# Patient Record
Sex: MALE | Race: BLACK OR AFRICAN AMERICAN | Employment: UNEMPLOYED | URBAN - METROPOLITAN AREA
[De-identification: names, ages, dates, MRNs, and addresses within clinical notes are randomized per-mention and may not be internally consistent; named-entity substitution may affect disease eponyms.]

---

## 2022-01-01 ENCOUNTER — APPOINTMENT (OUTPATIENT)
Dept: GENERAL RADIOLOGY | Age: 67
DRG: 283 | End: 2022-01-01
Payer: COMMERCIAL

## 2022-01-01 ENCOUNTER — HOSPITAL ENCOUNTER (INPATIENT)
Age: 67
LOS: 5 days | DRG: 283 | End: 2022-10-25
Attending: EMERGENCY MEDICINE | Admitting: INTERNAL MEDICINE
Payer: COMMERCIAL

## 2022-01-01 ENCOUNTER — APPOINTMENT (OUTPATIENT)
Dept: CT IMAGING | Age: 67
DRG: 283 | End: 2022-01-01
Attending: PHYSICIAN ASSISTANT
Payer: COMMERCIAL

## 2022-01-01 ENCOUNTER — APPOINTMENT (OUTPATIENT)
Dept: NON INVASIVE DIAGNOSTICS | Age: 67
DRG: 283 | End: 2022-01-01
Attending: PHYSICIAN ASSISTANT
Payer: COMMERCIAL

## 2022-01-01 ENCOUNTER — APPOINTMENT (OUTPATIENT)
Dept: GENERAL RADIOLOGY | Age: 67
DRG: 283 | End: 2022-01-01
Attending: PHYSICIAN ASSISTANT
Payer: COMMERCIAL

## 2022-01-01 ENCOUNTER — APPOINTMENT (OUTPATIENT)
Dept: GENERAL RADIOLOGY | Age: 67
DRG: 283 | End: 2022-01-01
Attending: INTERNAL MEDICINE
Payer: COMMERCIAL

## 2022-01-01 ENCOUNTER — APPOINTMENT (OUTPATIENT)
Dept: CT IMAGING | Age: 67
DRG: 283 | End: 2022-01-01
Attending: INTERNAL MEDICINE
Payer: COMMERCIAL

## 2022-01-01 ENCOUNTER — APPOINTMENT (OUTPATIENT)
Dept: GENERAL RADIOLOGY | Age: 67
DRG: 283 | End: 2022-01-01
Attending: STUDENT IN AN ORGANIZED HEALTH CARE EDUCATION/TRAINING PROGRAM
Payer: COMMERCIAL

## 2022-01-01 VITALS — BODY MASS INDEX: 28.24 KG/M2 | HEIGHT: 67 IN | TEMPERATURE: 98.1 F | WEIGHT: 179.9 LBS | HEART RATE: 71 BPM

## 2022-01-01 DIAGNOSIS — R57.0 CARDIOGENIC SHOCK (HCC): ICD-10-CM

## 2022-01-01 DIAGNOSIS — J96.01 ACUTE RESPIRATORY FAILURE WITH HYPOXIA (HCC): Primary | ICD-10-CM

## 2022-01-01 DIAGNOSIS — Z71.89 GOALS OF CARE, COUNSELING/DISCUSSION: ICD-10-CM

## 2022-01-01 DIAGNOSIS — I21.4 NSTEMI (NON-ST ELEVATED MYOCARDIAL INFARCTION) (HCC): ICD-10-CM

## 2022-01-01 DIAGNOSIS — G93.40 ACUTE ENCEPHALOPATHY: ICD-10-CM

## 2022-01-01 DIAGNOSIS — I50.9 ACUTE ON CHRONIC CONGESTIVE HEART FAILURE, UNSPECIFIED HEART FAILURE TYPE (HCC): ICD-10-CM

## 2022-01-01 DIAGNOSIS — J15.5 PNEUMONIA DUE TO ESCHERICHIA COLI, UNSPECIFIED LATERALITY, UNSPECIFIED PART OF LUNG (HCC): ICD-10-CM

## 2022-01-01 DIAGNOSIS — Z51.5 ENCOUNTER FOR PALLIATIVE CARE: ICD-10-CM

## 2022-01-01 DIAGNOSIS — A41.51 SEPSIS DUE TO ESCHERICHIA COLI, UNSPECIFIED WHETHER ACUTE ORGAN DYSFUNCTION PRESENT (HCC): ICD-10-CM

## 2022-01-01 LAB
ACC. NO. FROM MICRO ORDER, ACCP: ABNORMAL
ACINETOBACTER CALCOACETICUS-BAUMANII COMPLEX, ACBCX: NOT DETECTED
ALBUMIN SERPL-MCNC: 2.6 G/DL (ref 3.4–5)
ALBUMIN SERPL-MCNC: 2.8 G/DL (ref 3.4–5)
ALBUMIN SERPL-MCNC: 2.9 G/DL (ref 3.4–5)
ALBUMIN SERPL-MCNC: 3 G/DL (ref 3.4–5)
ALBUMIN SERPL-MCNC: 3.1 G/DL (ref 3.4–5)
ALBUMIN SERPL-MCNC: 3.2 G/DL (ref 3.4–5)
ALBUMIN SERPL-MCNC: 3.2 G/DL (ref 3.4–5)
ALBUMIN SERPL-MCNC: 3.3 G/DL (ref 3.4–5)
ALBUMIN/GLOB SERPL: 0.8 {RATIO} (ref 0.8–1.7)
ALP SERPL-CCNC: 63 U/L (ref 45–117)
ALT SERPL-CCNC: 16 U/L (ref 16–61)
AMPHET UR QL SCN: NEGATIVE
ANION GAP SERPL CALC-SCNC: 10 MMOL/L (ref 3–18)
ANION GAP SERPL CALC-SCNC: 10 MMOL/L (ref 3–18)
ANION GAP SERPL CALC-SCNC: 6 MMOL/L (ref 3–18)
ANION GAP SERPL CALC-SCNC: 7 MMOL/L (ref 3–18)
ANION GAP SERPL CALC-SCNC: 8 MMOL/L (ref 3–18)
APPEARANCE UR: CLEAR
APTT PPP: 31.2 SEC (ref 23–36.4)
APTT PPP: 37.1 SEC (ref 23–36.4)
APTT PPP: 48.2 SEC (ref 23–36.4)
APTT PPP: 49.6 SEC (ref 23–36.4)
APTT PPP: 49.6 SEC (ref 23–36.4)
APTT PPP: 49.8 SEC (ref 23–36.4)
APTT PPP: 58.6 SEC (ref 23–36.4)
APTT PPP: 60.6 SEC (ref 23–36.4)
APTT PPP: 64 SEC (ref 23–36.4)
APTT PPP: 65.7 SEC (ref 23–36.4)
APTT PPP: 68 SEC (ref 23–36.4)
APTT PPP: 74.3 SEC (ref 23–36.4)
APTT PPP: 79.5 SEC (ref 23–36.4)
ARTERIAL PATENCY WRIST A: ABNORMAL
ARTERIAL PATENCY WRIST A: POSITIVE
AST SERPL-CCNC: 63 U/L (ref 10–38)
ATRIAL RATE: 101 BPM
ATRIAL RATE: 115 BPM
ATRIAL RATE: 72 BPM
ATRIAL RATE: 99 BPM
B PERT DNA SPEC QL NAA+PROBE: NOT DETECTED
BACTERIA SPEC CULT: ABNORMAL
BACTERIA SPEC CULT: NORMAL
BACTERIA SPEC CULT: NORMAL
BACTERIA URNS QL MICRO: NEGATIVE /HPF
BACTEROIDES FRAGILIS, BFRA: NOT DETECTED
BARBITURATES UR QL SCN: NEGATIVE
BASE DEFICIT BLD-SCNC: 0.6 MMOL/L
BASE DEFICIT BLD-SCNC: 0.9 MMOL/L
BASE DEFICIT BLD-SCNC: 1.7 MMOL/L
BASE DEFICIT BLD-SCNC: 2.5 MMOL/L
BASE DEFICIT BLD-SCNC: 3.1 MMOL/L
BASE EXCESS BLD CALC-SCNC: 0.3 MMOL/L
BASE EXCESS BLD CALC-SCNC: 1.8 MMOL/L
BASE EXCESS BLD CALC-SCNC: 2.8 MMOL/L
BASE EXCESS BLD CALC-SCNC: 3.2 MMOL/L
BASE EXCESS BLD CALC-SCNC: 3.5 MMOL/L
BASOPHILS # BLD: 0 K/UL (ref 0–0.1)
BASOPHILS # BLD: 0.1 K/UL (ref 0–0.1)
BASOPHILS # BLD: 0.1 K/UL (ref 0–0.1)
BASOPHILS NFR BLD: 0 % (ref 0–2)
BASOPHILS NFR BLD: 1 % (ref 0–2)
BDY SITE: ABNORMAL
BENZODIAZ UR QL: POSITIVE
BILIRUB SERPL-MCNC: 0.7 MG/DL (ref 0.2–1)
BILIRUB UR QL: NEGATIVE
BIOFIRE COMMENT, BCIDPF: ABNORMAL
BNP SERPL-MCNC: 6190 PG/ML (ref 0–900)
BNP SERPL-MCNC: 7481 PG/ML (ref 0–900)
BODY TEMPERATURE: 99
BODY TEMPERATURE: 99
BORDETELLA PARAPERTUSSIS PCR, BORPAR: NOT DETECTED
BUN SERPL-MCNC: 19 MG/DL (ref 7–18)
BUN SERPL-MCNC: 19 MG/DL (ref 7–18)
BUN SERPL-MCNC: 20 MG/DL (ref 7–18)
BUN SERPL-MCNC: 21 MG/DL (ref 7–18)
BUN SERPL-MCNC: 21 MG/DL (ref 7–18)
BUN SERPL-MCNC: 22 MG/DL (ref 7–18)
BUN SERPL-MCNC: 24 MG/DL (ref 7–18)
BUN SERPL-MCNC: 24 MG/DL (ref 7–18)
BUN/CREAT SERPL: 15 (ref 12–20)
BUN/CREAT SERPL: 16 (ref 12–20)
BUN/CREAT SERPL: 16 (ref 12–20)
BUN/CREAT SERPL: 17 (ref 12–20)
BUN/CREAT SERPL: 20 (ref 12–20)
BUN/CREAT SERPL: 22 (ref 12–20)
BUN/CREAT SERPL: 25 (ref 12–20)
BUN/CREAT SERPL: 25 (ref 12–20)
BUN/CREAT SERPL: 26 (ref 12–20)
BUN/CREAT SERPL: 26 (ref 12–20)
BUN/CREAT SERPL: 29 (ref 12–20)
BUN/CREAT SERPL: 31 (ref 12–20)
BUN/CREAT SERPL: 31 (ref 12–20)
BUN/CREAT SERPL: 35 (ref 12–20)
C GLABRATA DNA VAG QL NAA+PROBE: NOT DETECTED
C PNEUM DNA SPEC QL NAA+PROBE: NOT DETECTED
CA-I SERPL-SCNC: 1.18 MMOL/L (ref 1.15–1.33)
CALCIUM SERPL-MCNC: 7.8 MG/DL (ref 8.5–10.1)
CALCIUM SERPL-MCNC: 7.9 MG/DL (ref 8.5–10.1)
CALCIUM SERPL-MCNC: 8 MG/DL (ref 8.5–10.1)
CALCIUM SERPL-MCNC: 8 MG/DL (ref 8.5–10.1)
CALCIUM SERPL-MCNC: 8.3 MG/DL (ref 8.5–10.1)
CALCIUM SERPL-MCNC: 8.6 MG/DL (ref 8.5–10.1)
CALCIUM SERPL-MCNC: 8.6 MG/DL (ref 8.5–10.1)
CALCIUM SERPL-MCNC: 8.7 MG/DL (ref 8.5–10.1)
CALCIUM SERPL-MCNC: 8.8 MG/DL (ref 8.5–10.1)
CALCIUM SERPL-MCNC: 9 MG/DL (ref 8.5–10.1)
CALCIUM SERPL-MCNC: 9.1 MG/DL (ref 8.5–10.1)
CALCIUM SERPL-MCNC: 9.2 MG/DL (ref 8.5–10.1)
CALCIUM SERPL-MCNC: 9.3 MG/DL (ref 8.5–10.1)
CALCULATED P AXIS, ECG09: 18 DEGREES
CALCULATED P AXIS, ECG09: 37 DEGREES
CALCULATED P AXIS, ECG09: 54 DEGREES
CALCULATED P AXIS, ECG09: 74 DEGREES
CALCULATED R AXIS, ECG10: -56 DEGREES
CALCULATED R AXIS, ECG10: -56 DEGREES
CALCULATED R AXIS, ECG10: -59 DEGREES
CALCULATED R AXIS, ECG10: -62 DEGREES
CALCULATED T AXIS, ECG11: -88 DEGREES
CALCULATED T AXIS, ECG11: 100 DEGREES
CALCULATED T AXIS, ECG11: 115 DEGREES
CALCULATED T AXIS, ECG11: 123 DEGREES
CANDIDA ALBICANS: NOT DETECTED
CANDIDA AURIS, CAAU: NOT DETECTED
CANDIDA KRUSEI, CKRP: NOT DETECTED
CANDIDA PARAPSILOSIS, CPAUP: NOT DETECTED
CANDIDA TROPICALIS, CTROP: NOT DETECTED
CANNABINOIDS UR QL SCN: NEGATIVE
CHLORIDE SERPL-SCNC: 103 MMOL/L (ref 100–111)
CHLORIDE SERPL-SCNC: 103 MMOL/L (ref 100–111)
CHLORIDE SERPL-SCNC: 104 MMOL/L (ref 100–111)
CHLORIDE SERPL-SCNC: 104 MMOL/L (ref 100–111)
CHLORIDE SERPL-SCNC: 105 MMOL/L (ref 100–111)
CHLORIDE SERPL-SCNC: 106 MMOL/L (ref 100–111)
CHLORIDE SERPL-SCNC: 107 MMOL/L (ref 100–111)
CHLORIDE SERPL-SCNC: 108 MMOL/L (ref 100–111)
CHLORIDE SERPL-SCNC: 109 MMOL/L (ref 100–111)
CHLORIDE SERPL-SCNC: 110 MMOL/L (ref 100–111)
CO2 SERPL-SCNC: 24 MMOL/L (ref 21–32)
CO2 SERPL-SCNC: 25 MMOL/L (ref 21–32)
CO2 SERPL-SCNC: 26 MMOL/L (ref 21–32)
CO2 SERPL-SCNC: 26 MMOL/L (ref 21–32)
CO2 SERPL-SCNC: 27 MMOL/L (ref 21–32)
CO2 SERPL-SCNC: 28 MMOL/L (ref 21–32)
CO2 SERPL-SCNC: 29 MMOL/L (ref 21–32)
CO2 SERPL-SCNC: 30 MMOL/L (ref 21–32)
CO2 SERPL-SCNC: 31 MMOL/L (ref 21–32)
COCAINE UR QL SCN: NEGATIVE
COLOR UR: YELLOW
CREAT SERPL-MCNC: 0.63 MG/DL (ref 0.6–1.3)
CREAT SERPL-MCNC: 0.7 MG/DL (ref 0.6–1.3)
CREAT SERPL-MCNC: 0.71 MG/DL (ref 0.6–1.3)
CREAT SERPL-MCNC: 0.72 MG/DL (ref 0.6–1.3)
CREAT SERPL-MCNC: 0.77 MG/DL (ref 0.6–1.3)
CREAT SERPL-MCNC: 0.84 MG/DL (ref 0.6–1.3)
CREAT SERPL-MCNC: 0.85 MG/DL (ref 0.6–1.3)
CREAT SERPL-MCNC: 0.92 MG/DL (ref 0.6–1.3)
CREAT SERPL-MCNC: 0.93 MG/DL (ref 0.6–1.3)
CREAT SERPL-MCNC: 0.94 MG/DL (ref 0.6–1.3)
CREAT SERPL-MCNC: 1.02 MG/DL (ref 0.6–1.3)
CREAT SERPL-MCNC: 1.18 MG/DL (ref 0.6–1.3)
CREAT SERPL-MCNC: 1.19 MG/DL (ref 0.6–1.3)
CREAT SERPL-MCNC: 1.19 MG/DL (ref 0.6–1.3)
CREAT SERPL-MCNC: 1.28 MG/DL (ref 0.6–1.3)
CREAT SERPL-MCNC: 1.45 MG/DL (ref 0.6–1.3)
CRYPTO NEOFORMANS/GATTII, CRYNEG: NOT DETECTED
DIAGNOSIS, 93000: NORMAL
DIFFERENTIAL METHOD BLD: ABNORMAL
ECHO AO ROOT DIAM: 3.5 CM
ECHO AO ROOT INDEX: 1.81 CM/M2
ECHO LA VOL 2C: 67 ML (ref 18–58)
ECHO LA VOL 4C: 73 ML (ref 18–58)
ECHO LA VOLUME AREA LENGTH: 81 ML
ECHO LA VOLUME INDEX A2C: 35 ML/M2 (ref 16–34)
ECHO LA VOLUME INDEX A4C: 38 ML/M2 (ref 16–34)
ECHO LA VOLUME INDEX AREA LENGTH: 42 ML/M2 (ref 16–34)
ECHO LV FRACTIONAL SHORTENING: 8 % (ref 28–44)
ECHO LV INTERNAL DIMENSION DIASTOLE INDEX: 3.26 CM/M2
ECHO LV INTERNAL DIMENSION DIASTOLIC: 6.3 CM (ref 4.2–5.9)
ECHO LV INTERNAL DIMENSION SYSTOLIC INDEX: 3.01 CM/M2
ECHO LV INTERNAL DIMENSION SYSTOLIC: 5.8 CM
ECHO LV IVSD: 1.2 CM (ref 0.6–1)
ECHO LV MASS 2D: 303.5 G (ref 88–224)
ECHO LV MASS INDEX 2D: 157.3 G/M2 (ref 49–115)
ECHO LV POSTERIOR WALL DIASTOLIC: 1 CM (ref 0.6–1)
ECHO LV RELATIVE WALL THICKNESS RATIO: 0.32
ECHO LVOT AREA: 3.1 CM2
ECHO LVOT DIAM: 2 CM
ECHO LVOT MEAN GRADIENT: 3 MMHG
ECHO LVOT PEAK GRADIENT: 0 MILLIMETER OF MERCURY COLUMN
ECHO LVOT PEAK GRADIENT: 6 MMHG
ECHO LVOT PEAK VELOCITY: 0 M/S
ECHO LVOT PEAK VELOCITY: 1.2 M/S
ECHO LVOT STROKE VOLUME INDEX: 34.2 ML/M2
ECHO LVOT SV: 65.9 ML
ECHO LVOT VTI: 21 CM
ECHO RV FREE WALL PEAK S': 12 CM/S
ECHO RV TAPSE: 1.3 CM (ref 1.7–?)
ENTEROBACTER CLOACAE COMPLEX, ECCP: NOT DETECTED
ENTEROBACTERALES SP. , ENBLS: NOT DETECTED
ENTEROCOCCUS FAECALIS, ENFA: NOT DETECTED
ENTEROCOCCUS FAECIUM, ENFAM: NOT DETECTED
EOSINOPHIL # BLD: 0 K/UL (ref 0–0.4)
EOSINOPHIL # BLD: 0.1 K/UL (ref 0–0.4)
EOSINOPHIL # BLD: 0.1 K/UL (ref 0–0.4)
EOSINOPHIL # BLD: 0.2 K/UL (ref 0–0.4)
EOSINOPHIL NFR BLD: 0 % (ref 0–5)
EOSINOPHIL NFR BLD: 1 % (ref 0–5)
EOSINOPHIL NFR BLD: 2 % (ref 0–5)
EOSINOPHIL NFR BLD: 3 % (ref 0–5)
EPITH CASTS URNS QL MICRO: ABNORMAL /LPF (ref 0–5)
ERYTHROCYTE [DISTWIDTH] IN BLOOD BY AUTOMATED COUNT: 14.2 % (ref 11.6–14.5)
ERYTHROCYTE [DISTWIDTH] IN BLOOD BY AUTOMATED COUNT: 14.3 % (ref 11.6–14.5)
ERYTHROCYTE [DISTWIDTH] IN BLOOD BY AUTOMATED COUNT: 14.4 % (ref 11.6–14.5)
ERYTHROCYTE [DISTWIDTH] IN BLOOD BY AUTOMATED COUNT: 14.4 % (ref 11.6–14.5)
ERYTHROCYTE [DISTWIDTH] IN BLOOD BY AUTOMATED COUNT: 14.5 % (ref 11.6–14.5)
ESCHERICHIA COLI: NOT DETECTED
EST. AVERAGE GLUCOSE BLD GHB EST-MCNC: 114 MG/DL
ETHANOL SERPL-MCNC: <3 MG/DL (ref 0–3)
FLUAV RNA SPEC QL NAA+PROBE: NOT DETECTED
FLUAV SUBTYP SPEC NAA+PROBE: NOT DETECTED
FLUBV RNA SPEC QL NAA+PROBE: NOT DETECTED
FLUBV RNA SPEC QL NAA+PROBE: NOT DETECTED
GAS FLOW.O2 O2 DELIVERY SYS: ABNORMAL L/MIN
GAS FLOW.O2 SETTING OXYMISER: 12 BPM
GAS FLOW.O2 SETTING OXYMISER: 14 BPM
GAS FLOW.O2 SETTING OXYMISER: 17 BPM
GAS FLOW.O2 SETTING OXYMISER: 20 BPM
GAS FLOW.O2 SETTING OXYMISER: 20 BPM
GLOBULIN SER CALC-MCNC: 3.9 G/DL (ref 2–4)
GLUCOSE BLD STRIP.AUTO-MCNC: 112 MG/DL (ref 70–110)
GLUCOSE BLD STRIP.AUTO-MCNC: 112 MG/DL (ref 70–110)
GLUCOSE BLD STRIP.AUTO-MCNC: 117 MG/DL (ref 70–110)
GLUCOSE BLD STRIP.AUTO-MCNC: 122 MG/DL (ref 70–110)
GLUCOSE BLD STRIP.AUTO-MCNC: 126 MG/DL (ref 70–110)
GLUCOSE BLD STRIP.AUTO-MCNC: 129 MG/DL (ref 70–110)
GLUCOSE BLD STRIP.AUTO-MCNC: 129 MG/DL (ref 70–110)
GLUCOSE BLD STRIP.AUTO-MCNC: 135 MG/DL (ref 70–110)
GLUCOSE BLD STRIP.AUTO-MCNC: 137 MG/DL (ref 70–110)
GLUCOSE BLD STRIP.AUTO-MCNC: 138 MG/DL (ref 70–110)
GLUCOSE BLD STRIP.AUTO-MCNC: 141 MG/DL (ref 70–110)
GLUCOSE BLD STRIP.AUTO-MCNC: 144 MG/DL (ref 70–110)
GLUCOSE BLD STRIP.AUTO-MCNC: 144 MG/DL (ref 70–110)
GLUCOSE BLD STRIP.AUTO-MCNC: 149 MG/DL (ref 70–110)
GLUCOSE BLD STRIP.AUTO-MCNC: 160 MG/DL (ref 70–110)
GLUCOSE BLD STRIP.AUTO-MCNC: 169 MG/DL (ref 70–110)
GLUCOSE BLD STRIP.AUTO-MCNC: 170 MG/DL (ref 70–110)
GLUCOSE BLD STRIP.AUTO-MCNC: 172 MG/DL (ref 70–110)
GLUCOSE BLD STRIP.AUTO-MCNC: 186 MG/DL (ref 70–110)
GLUCOSE SERPL-MCNC: 121 MG/DL (ref 74–99)
GLUCOSE SERPL-MCNC: 133 MG/DL (ref 74–99)
GLUCOSE SERPL-MCNC: 140 MG/DL (ref 74–99)
GLUCOSE SERPL-MCNC: 140 MG/DL (ref 74–99)
GLUCOSE SERPL-MCNC: 143 MG/DL (ref 74–99)
GLUCOSE SERPL-MCNC: 143 MG/DL (ref 74–99)
GLUCOSE SERPL-MCNC: 150 MG/DL (ref 74–99)
GLUCOSE SERPL-MCNC: 155 MG/DL (ref 74–99)
GLUCOSE SERPL-MCNC: 158 MG/DL (ref 74–99)
GLUCOSE SERPL-MCNC: 163 MG/DL (ref 74–99)
GLUCOSE SERPL-MCNC: 166 MG/DL (ref 74–99)
GLUCOSE SERPL-MCNC: 167 MG/DL (ref 74–99)
GLUCOSE SERPL-MCNC: 168 MG/DL (ref 74–99)
GLUCOSE SERPL-MCNC: 192 MG/DL (ref 74–99)
GLUCOSE UR STRIP.AUTO-MCNC: NEGATIVE MG/DL
GRAM STN SPEC: ABNORMAL
HADV DNA SPEC QL NAA+PROBE: NOT DETECTED
HAEMOPHILUS INFLUENZAE, HMI: NOT DETECTED
HBA1C MFR BLD: 5.6 % (ref 4.2–5.6)
HCO3 BLD-SCNC: 20.3 MMOL/L (ref 22–26)
HCO3 BLD-SCNC: 21.7 MMOL/L (ref 22–26)
HCO3 BLD-SCNC: 22.2 MMOL/L (ref 22–26)
HCO3 BLD-SCNC: 22.3 MMOL/L (ref 22–26)
HCO3 BLD-SCNC: 24.1 MMOL/L (ref 22–26)
HCO3 BLD-SCNC: 26.3 MMOL/L (ref 22–26)
HCO3 BLD-SCNC: 26.6 MMOL/L (ref 22–26)
HCO3 BLD-SCNC: 28.7 MMOL/L (ref 22–26)
HCO3 BLD-SCNC: 28.7 MMOL/L (ref 22–26)
HCO3 BLD-SCNC: 30.4 MMOL/L (ref 22–26)
HCOV 229E RNA SPEC QL NAA+PROBE: NOT DETECTED
HCOV HKU1 RNA SPEC QL NAA+PROBE: NOT DETECTED
HCOV NL63 RNA SPEC QL NAA+PROBE: NOT DETECTED
HCOV OC43 RNA SPEC QL NAA+PROBE: NOT DETECTED
HCT VFR BLD AUTO: 27.8 % (ref 36–48)
HCT VFR BLD AUTO: 28.7 % (ref 36–48)
HCT VFR BLD AUTO: 28.8 % (ref 36–48)
HCT VFR BLD AUTO: 30.6 % (ref 36–48)
HCT VFR BLD AUTO: 31.5 % (ref 36–48)
HCT VFR BLD AUTO: 37.5 % (ref 36–48)
HCT VFR BLD AUTO: 37.9 % (ref 36–48)
HDSCOM,HDSCOM: ABNORMAL
HGB BLD-MCNC: 11.8 G/DL (ref 13–16)
HGB BLD-MCNC: 11.9 G/DL (ref 13–16)
HGB BLD-MCNC: 8.3 G/DL (ref 13–16)
HGB BLD-MCNC: 8.7 G/DL (ref 13–16)
HGB BLD-MCNC: 8.8 G/DL (ref 13–16)
HGB BLD-MCNC: 9.2 G/DL (ref 13–16)
HGB BLD-MCNC: 9.7 G/DL (ref 13–16)
HGB UR QL STRIP: ABNORMAL
HMPV RNA SPEC QL NAA+PROBE: NOT DETECTED
HPIV1 RNA SPEC QL NAA+PROBE: NOT DETECTED
HPIV2 RNA SPEC QL NAA+PROBE: NOT DETECTED
HPIV3 RNA SPEC QL NAA+PROBE: NOT DETECTED
HPIV4 RNA SPEC QL NAA+PROBE: NOT DETECTED
HYALINE CASTS URNS QL MICRO: ABNORMAL /LPF (ref 0–2)
IMM GRANULOCYTES # BLD AUTO: 0 K/UL (ref 0–0.04)
IMM GRANULOCYTES # BLD AUTO: 0.1 K/UL (ref 0–0.04)
IMM GRANULOCYTES # BLD AUTO: 0.1 K/UL (ref 0–0.04)
IMM GRANULOCYTES NFR BLD AUTO: 0 % (ref 0–0.5)
IMM GRANULOCYTES NFR BLD AUTO: 1 % (ref 0–0.5)
INR PPP: 1.1 (ref 0.8–1.2)
KETONES UR QL STRIP.AUTO: NEGATIVE MG/DL
KLEBSIELLA AEROGENES, KLAE: NOT DETECTED
KLEBSIELLA OXYTOCA: NOT DETECTED
KLEBSIELLA PNEUMONIAE GROUP, KPPG: NOT DETECTED
LACTATE BLD-SCNC: 1.83 MMOL/L (ref 0.4–2)
LACTATE BLD-SCNC: 2.03 MMOL/L (ref 0.4–2)
LACTATE BLD-SCNC: 2.32 MMOL/L (ref 0.4–2)
LACTATE SERPL-SCNC: 1.5 MMOL/L (ref 0.4–2)
LACTATE SERPL-SCNC: 1.7 MMOL/L (ref 0.4–2)
LACTATE SERPL-SCNC: 2 MMOL/L (ref 0.4–2)
LEUKOCYTE ESTERASE UR QL STRIP.AUTO: ABNORMAL
LISTERIA MONOCYTOGENES, LMONP: NOT DETECTED
LYMPHOCYTES # BLD: 0.8 K/UL (ref 0.9–3.6)
LYMPHOCYTES # BLD: 0.8 K/UL (ref 0.9–3.6)
LYMPHOCYTES # BLD: 0.9 K/UL (ref 0.9–3.6)
LYMPHOCYTES # BLD: 1.1 K/UL (ref 0.9–3.6)
LYMPHOCYTES # BLD: 1.6 K/UL (ref 0.9–3.6)
LYMPHOCYTES # BLD: 1.9 K/UL (ref 0.9–3.6)
LYMPHOCYTES # BLD: 3 K/UL (ref 0.9–3.6)
LYMPHOCYTES NFR BLD: 10 % (ref 21–52)
LYMPHOCYTES NFR BLD: 13 % (ref 21–52)
LYMPHOCYTES NFR BLD: 15 % (ref 21–52)
LYMPHOCYTES NFR BLD: 17 % (ref 21–52)
LYMPHOCYTES NFR BLD: 18 % (ref 21–52)
LYMPHOCYTES NFR BLD: 24 % (ref 21–52)
LYMPHOCYTES NFR BLD: 7 % (ref 21–52)
M PNEUMO DNA SPEC QL NAA+PROBE: NOT DETECTED
MAGNESIUM SERPL-MCNC: 1.6 MG/DL (ref 1.6–2.6)
MAGNESIUM SERPL-MCNC: 2.1 MG/DL (ref 1.6–2.6)
MAGNESIUM SERPL-MCNC: 2.2 MG/DL (ref 1.6–2.6)
MAGNESIUM SERPL-MCNC: 2.3 MG/DL (ref 1.6–2.6)
MAGNESIUM SERPL-MCNC: 2.4 MG/DL (ref 1.6–2.6)
MAGNESIUM SERPL-MCNC: 2.6 MG/DL (ref 1.6–2.6)
MCH RBC QN AUTO: 27.1 PG (ref 24–34)
MCH RBC QN AUTO: 27.3 PG (ref 24–34)
MCH RBC QN AUTO: 27.5 PG (ref 24–34)
MCH RBC QN AUTO: 27.7 PG (ref 24–34)
MCH RBC QN AUTO: 27.7 PG (ref 24–34)
MCH RBC QN AUTO: 27.8 PG (ref 24–34)
MCH RBC QN AUTO: 28.3 PG (ref 24–34)
MCHC RBC AUTO-ENTMCNC: 29.9 G/DL (ref 31–37)
MCHC RBC AUTO-ENTMCNC: 30.1 G/DL (ref 31–37)
MCHC RBC AUTO-ENTMCNC: 30.2 G/DL (ref 31–37)
MCHC RBC AUTO-ENTMCNC: 30.7 G/DL (ref 31–37)
MCHC RBC AUTO-ENTMCNC: 30.8 G/DL (ref 31–37)
MCHC RBC AUTO-ENTMCNC: 31.1 G/DL (ref 31–37)
MCHC RBC AUTO-ENTMCNC: 31.7 G/DL (ref 31–37)
MCV RBC AUTO: 86.9 FL (ref 78–100)
MCV RBC AUTO: 87.6 FL (ref 78–100)
MCV RBC AUTO: 90 FL (ref 78–100)
MCV RBC AUTO: 90.3 FL (ref 78–100)
MCV RBC AUTO: 91.6 FL (ref 78–100)
MCV RBC AUTO: 92.3 FL (ref 78–100)
MCV RBC AUTO: 92.7 FL (ref 78–100)
MECA/C (METHICILLIN RESISTANT GENE), MECACP: NOT DETECTED
METHADONE UR QL: NEGATIVE
MONOCYTES # BLD: 0.4 K/UL (ref 0.05–1.2)
MONOCYTES # BLD: 0.5 K/UL (ref 0.05–1.2)
MONOCYTES # BLD: 0.6 K/UL (ref 0.05–1.2)
MONOCYTES # BLD: 0.6 K/UL (ref 0.05–1.2)
MONOCYTES # BLD: 0.7 K/UL (ref 0.05–1.2)
MONOCYTES # BLD: 0.7 K/UL (ref 0.05–1.2)
MONOCYTES # BLD: 1.1 K/UL (ref 0.05–1.2)
MONOCYTES NFR BLD: 6 % (ref 3–10)
MONOCYTES NFR BLD: 7 % (ref 3–10)
MONOCYTES NFR BLD: 8 % (ref 3–10)
MONOCYTES NFR BLD: 9 % (ref 3–10)
NEISSERIA MENINGITIDIS, NMNI: NOT DETECTED
NEUTS SEG # BLD: 4.5 K/UL (ref 1.8–8)
NEUTS SEG # BLD: 4.6 K/UL (ref 1.8–8)
NEUTS SEG # BLD: 7.6 K/UL (ref 1.8–8)
NEUTS SEG # BLD: 7.7 K/UL (ref 1.8–8)
NEUTS SEG # BLD: 8.3 K/UL (ref 1.8–8)
NEUTS SEG # BLD: 8.3 K/UL (ref 1.8–8)
NEUTS SEG # BLD: 8.8 K/UL (ref 1.8–8)
NEUTS SEG NFR BLD: 66 % (ref 40–73)
NEUTS SEG NFR BLD: 72 % (ref 40–73)
NEUTS SEG NFR BLD: 73 % (ref 40–73)
NEUTS SEG NFR BLD: 77 % (ref 40–73)
NEUTS SEG NFR BLD: 78 % (ref 40–73)
NEUTS SEG NFR BLD: 83 % (ref 40–73)
NEUTS SEG NFR BLD: 86 % (ref 40–73)
NITRITE UR QL STRIP.AUTO: NEGATIVE
NRBC # BLD: 0 K/UL (ref 0–0.01)
NRBC BLD-RTO: 0 PER 100 WBC
O2/TOTAL GAS SETTING VFR VENT: 100 %
O2/TOTAL GAS SETTING VFR VENT: 35 %
O2/TOTAL GAS SETTING VFR VENT: 40 %
O2/TOTAL GAS SETTING VFR VENT: 55 %
O2/TOTAL GAS SETTING VFR VENT: 55 %
O2/TOTAL GAS SETTING VFR VENT: 60 %
O2/TOTAL GAS SETTING VFR VENT: 75 %
O2/TOTAL GAS SETTING VFR VENT: 75 %
O2/TOTAL GAS SETTING VFR VENT: 90 %
O2/TOTAL GAS SETTING VFR VENT: 95 %
OPIATES UR QL: NEGATIVE
P-R INTERVAL, ECG05: 162 MS
P-R INTERVAL, ECG05: 166 MS
P-R INTERVAL, ECG05: 168 MS
P-R INTERVAL, ECG05: 180 MS
PAW @ MEAN EXP FLOW ON VENT: 14 CMH2O
PAW @ MEAN EXP FLOW ON VENT: 16 CMH2O
PAW @ MEAN EXP FLOW ON VENT: 23 CMH2O
PCO2 BLD: 25.1 MMHG (ref 35–45)
PCO2 BLD: 28.4 MMHG (ref 35–45)
PCO2 BLD: 30.9 MMHG (ref 35–45)
PCO2 BLD: 40.5 MMHG (ref 35–45)
PCO2 BLD: 41.8 MMHG (ref 35–45)
PCO2 BLD: 45 MMHG (ref 35–45)
PCO2 BLD: 46.8 MMHG (ref 35–45)
PCO2 BLD: 49.4 MMHG (ref 35–45)
PCO2 BLD: 51.6 MMHG (ref 35–45)
PCO2 BLD: 57 MMHG (ref 35–45)
PCP UR QL: NEGATIVE
PEEP RESPIRATORY: 10 CMH2O
PEEP RESPIRATORY: 15 CMH2O
PH BLD: 7.32 [PH] (ref 7.35–7.45)
PH BLD: 7.34 [PH] (ref 7.35–7.45)
PH BLD: 7.34 [PH] (ref 7.35–7.45)
PH BLD: 7.35 [PH] (ref 7.35–7.45)
PH BLD: 7.37 [PH] (ref 7.35–7.45)
PH BLD: 7.4 [PH] (ref 7.35–7.45)
PH BLD: 7.41 [PH] (ref 7.35–7.45)
PH BLD: 7.46 [PH] (ref 7.35–7.45)
PH BLD: 7.46 [PH] (ref 7.35–7.45)
PH BLD: 7.55 [PH] (ref 7.35–7.45)
PH UR STRIP: 5 [PH] (ref 5–8)
PHOSPHATE SERPL-MCNC: 1.9 MG/DL (ref 2.5–4.9)
PHOSPHATE SERPL-MCNC: 2.2 MG/DL (ref 2.5–4.9)
PHOSPHATE SERPL-MCNC: 2.3 MG/DL (ref 2.5–4.9)
PHOSPHATE SERPL-MCNC: 2.3 MG/DL (ref 2.5–4.9)
PHOSPHATE SERPL-MCNC: 2.5 MG/DL (ref 2.5–4.9)
PHOSPHATE SERPL-MCNC: 2.6 MG/DL (ref 2.5–4.9)
PHOSPHATE SERPL-MCNC: 2.6 MG/DL (ref 2.5–4.9)
PHOSPHATE SERPL-MCNC: 2.7 MG/DL (ref 2.5–4.9)
PHOSPHATE SERPL-MCNC: 2.8 MG/DL (ref 2.5–4.9)
PHOSPHATE SERPL-MCNC: 3.4 MG/DL (ref 2.5–4.9)
PHOSPHATE SERPL-MCNC: 3.5 MG/DL (ref 2.5–4.9)
PHOSPHATE SERPL-MCNC: 3.7 MG/DL (ref 2.5–4.9)
PIP ISTAT,IPIP: 22
PIP ISTAT,IPIP: 23
PIP ISTAT,IPIP: 34
PLATELET # BLD AUTO: 171 K/UL (ref 135–420)
PLATELET # BLD AUTO: 184 K/UL (ref 135–420)
PLATELET # BLD AUTO: 214 K/UL (ref 135–420)
PLATELET # BLD AUTO: 215 K/UL (ref 135–420)
PLATELET # BLD AUTO: 230 K/UL (ref 135–420)
PLATELET # BLD AUTO: 247 K/UL (ref 135–420)
PLATELET # BLD AUTO: 248 K/UL (ref 135–420)
PMV BLD AUTO: 10.5 FL (ref 9.2–11.8)
PMV BLD AUTO: 10.6 FL (ref 9.2–11.8)
PMV BLD AUTO: 10.6 FL (ref 9.2–11.8)
PMV BLD AUTO: 11.2 FL (ref 9.2–11.8)
PMV BLD AUTO: 11.6 FL (ref 9.2–11.8)
PMV BLD AUTO: 11.8 FL (ref 9.2–11.8)
PMV BLD AUTO: 11.9 FL (ref 9.2–11.8)
PO2 BLD: 120 MMHG (ref 80–100)
PO2 BLD: 142 MMHG (ref 80–100)
PO2 BLD: 168 MMHG (ref 80–100)
PO2 BLD: 199 MMHG (ref 80–100)
PO2 BLD: 226 MMHG (ref 80–100)
PO2 BLD: 241 MMHG (ref 80–100)
PO2 BLD: 248 MMHG (ref 80–100)
PO2 BLD: 60 MMHG (ref 80–100)
PO2 BLD: 64 MMHG (ref 80–100)
PO2 BLD: 91 MMHG (ref 80–100)
POTASSIUM SERPL-SCNC: 3 MMOL/L (ref 3.5–5.5)
POTASSIUM SERPL-SCNC: 3.3 MMOL/L (ref 3.5–5.5)
POTASSIUM SERPL-SCNC: 3.4 MMOL/L (ref 3.5–5.5)
POTASSIUM SERPL-SCNC: 3.6 MMOL/L (ref 3.5–5.5)
POTASSIUM SERPL-SCNC: 3.6 MMOL/L (ref 3.5–5.5)
POTASSIUM SERPL-SCNC: 3.7 MMOL/L (ref 3.5–5.5)
POTASSIUM SERPL-SCNC: 3.8 MMOL/L (ref 3.5–5.5)
POTASSIUM SERPL-SCNC: 3.9 MMOL/L (ref 3.5–5.5)
POTASSIUM SERPL-SCNC: 3.9 MMOL/L (ref 3.5–5.5)
POTASSIUM SERPL-SCNC: 4.1 MMOL/L (ref 3.5–5.5)
POTASSIUM SERPL-SCNC: 4.1 MMOL/L (ref 3.5–5.5)
PROCALCITONIN SERPL-MCNC: 1 NG/ML
PROT SERPL-MCNC: 7.2 G/DL (ref 6.4–8.2)
PROT UR STRIP-MCNC: NEGATIVE MG/DL
PROTEUS, PRP: NOT DETECTED
PROTHROMBIN TIME: 14.9 SEC (ref 11.5–15.2)
PSEUDOMONAS AERUGINOSA: NOT DETECTED
Q-T INTERVAL, ECG07: 318 MS
Q-T INTERVAL, ECG07: 348 MS
Q-T INTERVAL, ECG07: 370 MS
Q-T INTERVAL, ECG07: 570 MS
QRS DURATION, ECG06: 108 MS
QRS DURATION, ECG06: 126 MS
QRS DURATION, ECG06: 134 MS
QRS DURATION, ECG06: 140 MS
QTC CALCULATION (BEZET), ECG08: 439 MS
QTC CALCULATION (BEZET), ECG08: 451 MS
QTC CALCULATION (BEZET), ECG08: 474 MS
QTC CALCULATION (BEZET), ECG08: 624 MS
RBC # BLD AUTO: 3 M/UL (ref 4.35–5.65)
RBC # BLD AUTO: 3.11 M/UL (ref 4.35–5.65)
RBC # BLD AUTO: 3.19 M/UL (ref 4.35–5.65)
RBC # BLD AUTO: 3.34 M/UL (ref 4.35–5.65)
RBC # BLD AUTO: 3.5 M/UL (ref 4.35–5.65)
RBC # BLD AUTO: 4.28 M/UL (ref 4.35–5.65)
RBC # BLD AUTO: 4.36 M/UL (ref 4.35–5.65)
RBC #/AREA URNS HPF: ABNORMAL /HPF (ref 0–5)
RESISTANT GENE SPACE, REGENE: ABNORMAL
RSV RNA SPEC QL NAA+PROBE: NOT DETECTED
RV+EV RNA SPEC QL NAA+PROBE: NOT DETECTED
SALMONELLA, SALMO: NOT DETECTED
SAO2 % BLD: 90.7 % (ref 92–97)
SAO2 % BLD: 93.7 % (ref 92–97)
SAO2 % BLD: 96.6 % (ref 92–97)
SAO2 % BLD: 98.3 % (ref 92–97)
SAO2 % BLD: 99.2 % (ref 92–97)
SAO2 % BLD: 99.4 % (ref 92–97)
SAO2 % BLD: 99.7 % (ref 92–97)
SAO2 % BLD: 99.8 % (ref 92–97)
SAO2 % BLD: 99.9 % (ref 92–97)
SAO2 % BLD: 99.9 % (ref 92–97)
SARS-COV-2 PCR, COVPCR: NOT DETECTED
SARS-COV-2, COV2: NOT DETECTED
SERRATIA MARCESCENS: NOT DETECTED
SERVICE CMNT-IMP: ABNORMAL
SERVICE CMNT-IMP: NORMAL
SODIUM SERPL-SCNC: 140 MMOL/L (ref 136–145)
SODIUM SERPL-SCNC: 141 MMOL/L (ref 136–145)
SODIUM SERPL-SCNC: 142 MMOL/L (ref 136–145)
SODIUM SERPL-SCNC: 142 MMOL/L (ref 136–145)
SODIUM SERPL-SCNC: 143 MMOL/L (ref 136–145)
SODIUM SERPL-SCNC: 144 MMOL/L (ref 136–145)
SP GR UR REFRACTOMETRY: >1.03 (ref 1–1.03)
SPECIMEN TYPE: ABNORMAL
STAPH EPIDERMIDIS, STEP: DETECTED
STAPH LUGDUNENSIS, STALUG: NOT DETECTED
STAPHYLOCOCCUS AUREUS: NOT DETECTED
STAPHYLOCOCCUS, STAPP: DETECTED
STENO MALTOPHILIA, STMA: NOT DETECTED
STREPTOCOCCUS , STPSP: NOT DETECTED
STREPTOCOCCUS AGALACTIAE (GROUP B): NOT DETECTED
STREPTOCOCCUS PNEUMONIAE , SPNP: NOT DETECTED
STREPTOCOCCUS PYOGENES (GROUP A), SPYOP: NOT DETECTED
TOTAL RESP. RATE, ITRR: 14
TOTAL RESP. RATE, ITRR: 15
TOTAL RESP. RATE, ITRR: 15
TOTAL RESP. RATE, ITRR: 16
TOTAL RESP. RATE, ITRR: 17
TOTAL RESP. RATE, ITRR: 19
TOTAL RESP. RATE, ITRR: 20
TOTAL RESP. RATE, ITRR: 20
TOTAL RESP. RATE, ITRR: 23
TOTAL RESP. RATE, ITRR: 45
TROPONIN-HIGH SENSITIVITY: 1786 NG/L (ref 0–78)
TROPONIN-HIGH SENSITIVITY: 5076 NG/L (ref 0–78)
TROPONIN-HIGH SENSITIVITY: 6896 NG/L (ref 0–78)
TROPONIN-HIGH SENSITIVITY: 8773 NG/L (ref 0–78)
TSH SERPL DL<=0.05 MIU/L-ACNC: 1.06 UIU/ML (ref 0.36–3.74)
UROBILINOGEN UR QL STRIP.AUTO: 1 EU/DL (ref 0.2–1)
VANCOMYCIN SERPL-MCNC: 10.9 UG/ML (ref 5–40)
VENTILATION MODE VENT: ABNORMAL
VENTRICULAR RATE, ECG03: 101 BPM
VENTRICULAR RATE, ECG03: 115 BPM
VENTRICULAR RATE, ECG03: 72 BPM
VENTRICULAR RATE, ECG03: 99 BPM
VT SETTING VENT: 417 ML
VT SETTING VENT: 568 ML
WBC # BLD AUTO: 10.2 K/UL (ref 4.6–13.2)
WBC # BLD AUTO: 10.5 K/UL (ref 4.6–13.2)
WBC # BLD AUTO: 10.7 K/UL (ref 4.6–13.2)
WBC # BLD AUTO: 12.5 K/UL (ref 4.6–13.2)
WBC # BLD AUTO: 5.9 K/UL (ref 4.6–13.2)
WBC # BLD AUTO: 6.3 K/UL (ref 4.6–13.2)
WBC # BLD AUTO: 9.2 K/UL (ref 4.6–13.2)
WBC URNS QL MICRO: ABNORMAL /HPF (ref 0–4)

## 2022-01-01 PROCEDURE — 74011250636 HC RX REV CODE- 250/636: Performed by: NURSE PRACTITIONER

## 2022-01-01 PROCEDURE — 85730 THROMBOPLASTIN TIME PARTIAL: CPT

## 2022-01-01 PROCEDURE — 74011250636 HC RX REV CODE- 250/636: Performed by: PHYSICIAN ASSISTANT

## 2022-01-01 PROCEDURE — 0202U NFCT DS 22 TRGT SARS-COV-2: CPT

## 2022-01-01 PROCEDURE — 99285 EMERGENCY DEPT VISIT HI MDM: CPT

## 2022-01-01 PROCEDURE — 82803 BLOOD GASES ANY COMBINATION: CPT

## 2022-01-01 PROCEDURE — 74011000250 HC RX REV CODE- 250: Performed by: STUDENT IN AN ORGANIZED HEALTH CARE EDUCATION/TRAINING PROGRAM

## 2022-01-01 PROCEDURE — 80307 DRUG TEST PRSMV CHEM ANLYZR: CPT

## 2022-01-01 PROCEDURE — 87070 CULTURE OTHR SPECIMN AEROBIC: CPT

## 2022-01-01 PROCEDURE — 80069 RENAL FUNCTION PANEL: CPT

## 2022-01-01 PROCEDURE — 81001 URINALYSIS AUTO W/SCOPE: CPT

## 2022-01-01 PROCEDURE — 36600 WITHDRAWAL OF ARTERIAL BLOOD: CPT

## 2022-01-01 PROCEDURE — APPSS15 APP SPLIT SHARED TIME 0-15 MINUTES: Performed by: NURSE PRACTITIONER

## 2022-01-01 PROCEDURE — 71275 CT ANGIOGRAPHY CHEST: CPT

## 2022-01-01 PROCEDURE — 74011250636 HC RX REV CODE- 250/636: Performed by: INTERNAL MEDICINE

## 2022-01-01 PROCEDURE — 83605 ASSAY OF LACTIC ACID: CPT

## 2022-01-01 PROCEDURE — 74011250637 HC RX REV CODE- 250/637: Performed by: PHYSICIAN ASSISTANT

## 2022-01-01 PROCEDURE — 85025 COMPLETE CBC W/AUTO DIFF WBC: CPT

## 2022-01-01 PROCEDURE — 31500 INSERT EMERGENCY AIRWAY: CPT

## 2022-01-01 PROCEDURE — 74011250636 HC RX REV CODE- 250/636

## 2022-01-01 PROCEDURE — 99232 SBSQ HOSP IP/OBS MODERATE 35: CPT | Performed by: INTERNAL MEDICINE

## 2022-01-01 PROCEDURE — 83735 ASSAY OF MAGNESIUM: CPT

## 2022-01-01 PROCEDURE — 99291 CRITICAL CARE FIRST HOUR: CPT | Performed by: INTERNAL MEDICINE

## 2022-01-01 PROCEDURE — 94762 N-INVAS EAR/PLS OXIMTRY CONT: CPT

## 2022-01-01 PROCEDURE — APPNB15 APP NON BILLABLE TIME 0-15 MINS: Performed by: NURSE PRACTITIONER

## 2022-01-01 PROCEDURE — 93005 ELECTROCARDIOGRAM TRACING: CPT

## 2022-01-01 PROCEDURE — APPSS30 APP SPLIT SHARED TIME 16-30 MINUTES: Performed by: PHYSICIAN ASSISTANT

## 2022-01-01 PROCEDURE — 71046 X-RAY EXAM CHEST 2 VIEWS: CPT

## 2022-01-01 PROCEDURE — 74011000258 HC RX REV CODE- 258: Performed by: NURSE PRACTITIONER

## 2022-01-01 PROCEDURE — 74011000250 HC RX REV CODE- 250: Performed by: PHYSICIAN ASSISTANT

## 2022-01-01 PROCEDURE — 71045 X-RAY EXAM CHEST 1 VIEW: CPT

## 2022-01-01 PROCEDURE — 84443 ASSAY THYROID STIM HORMONE: CPT

## 2022-01-01 PROCEDURE — 74011250636 HC RX REV CODE- 250/636: Performed by: EMERGENCY MEDICINE

## 2022-01-01 PROCEDURE — 74011000258 HC RX REV CODE- 258: Performed by: INTERNAL MEDICINE

## 2022-01-01 PROCEDURE — 96375 TX/PRO/DX INJ NEW DRUG ADDON: CPT

## 2022-01-01 PROCEDURE — 5A1955Z RESPIRATORY VENTILATION, GREATER THAN 96 CONSECUTIVE HOURS: ICD-10-PCS | Performed by: STUDENT IN AN ORGANIZED HEALTH CARE EDUCATION/TRAINING PROGRAM

## 2022-01-01 PROCEDURE — 74011250636 HC RX REV CODE- 250/636: Performed by: STUDENT IN AN ORGANIZED HEALTH CARE EDUCATION/TRAINING PROGRAM

## 2022-01-01 PROCEDURE — 74011000258 HC RX REV CODE- 258: Performed by: PHYSICIAN ASSISTANT

## 2022-01-01 PROCEDURE — 87150 DNA/RNA AMPLIFIED PROBE: CPT

## 2022-01-01 PROCEDURE — 74011636637 HC RX REV CODE- 636/637

## 2022-01-01 PROCEDURE — APPSS15 APP SPLIT SHARED TIME 0-15 MINUTES: Performed by: PHYSICIAN ASSISTANT

## 2022-01-01 PROCEDURE — 84484 ASSAY OF TROPONIN QUANT: CPT

## 2022-01-01 PROCEDURE — 82077 ASSAY SPEC XCP UR&BREATH IA: CPT

## 2022-01-01 PROCEDURE — 36415 COLL VENOUS BLD VENIPUNCTURE: CPT

## 2022-01-01 PROCEDURE — 65610000006 HC RM INTENSIVE CARE

## 2022-01-01 PROCEDURE — 74011000250 HC RX REV CODE- 250: Performed by: INTERNAL MEDICINE

## 2022-01-01 PROCEDURE — 82962 GLUCOSE BLOOD TEST: CPT

## 2022-01-01 PROCEDURE — 83036 HEMOGLOBIN GLYCOSYLATED A1C: CPT

## 2022-01-01 PROCEDURE — 80048 BASIC METABOLIC PNL TOTAL CA: CPT

## 2022-01-01 PROCEDURE — 0BH17EZ INSERTION OF ENDOTRACHEAL AIRWAY INTO TRACHEA, VIA NATURAL OR ARTIFICIAL OPENING: ICD-10-PCS | Performed by: STUDENT IN AN ORGANIZED HEALTH CARE EDUCATION/TRAINING PROGRAM

## 2022-01-01 PROCEDURE — 2709999900 HC NON-CHARGEABLE SUPPLY

## 2022-01-01 PROCEDURE — 36556 INSERT NON-TUNNEL CV CATH: CPT | Performed by: INTERNAL MEDICINE

## 2022-01-01 PROCEDURE — 74018 RADEX ABDOMEN 1 VIEW: CPT

## 2022-01-01 PROCEDURE — 83880 ASSAY OF NATRIURETIC PEPTIDE: CPT

## 2022-01-01 PROCEDURE — P9047 ALBUMIN (HUMAN), 25%, 50ML: HCPCS | Performed by: PHYSICIAN ASSISTANT

## 2022-01-01 PROCEDURE — 82040 ASSAY OF SERUM ALBUMIN: CPT

## 2022-01-01 PROCEDURE — 96374 THER/PROPH/DIAG INJ IV PUSH: CPT

## 2022-01-01 PROCEDURE — 84145 PROCALCITONIN (PCT): CPT

## 2022-01-01 PROCEDURE — 85610 PROTHROMBIN TIME: CPT

## 2022-01-01 PROCEDURE — 87636 SARSCOV2 & INF A&B AMP PRB: CPT

## 2022-01-01 PROCEDURE — 87186 SC STD MICRODIL/AGAR DIL: CPT

## 2022-01-01 PROCEDURE — 94003 VENT MGMT INPAT SUBQ DAY: CPT

## 2022-01-01 PROCEDURE — 99233 SBSQ HOSP IP/OBS HIGH 50: CPT | Performed by: NURSE PRACTITIONER

## 2022-01-01 PROCEDURE — 3E033XZ INTRODUCTION OF VASOPRESSOR INTO PERIPHERAL VEIN, PERCUTANEOUS APPROACH: ICD-10-PCS | Performed by: INTERNAL MEDICINE

## 2022-01-01 PROCEDURE — 74011000250 HC RX REV CODE- 250: Performed by: NURSE PRACTITIONER

## 2022-01-01 PROCEDURE — 80053 COMPREHEN METABOLIC PANEL: CPT

## 2022-01-01 PROCEDURE — P9045 ALBUMIN (HUMAN), 5%, 250 ML: HCPCS | Performed by: PHYSICIAN ASSISTANT

## 2022-01-01 PROCEDURE — 82330 ASSAY OF CALCIUM: CPT

## 2022-01-01 PROCEDURE — 87040 BLOOD CULTURE FOR BACTERIA: CPT

## 2022-01-01 PROCEDURE — APPNB15 APP NON BILLABLE TIME 0-15 MINS: Performed by: PHYSICIAN ASSISTANT

## 2022-01-01 PROCEDURE — 99222 1ST HOSP IP/OBS MODERATE 55: CPT | Performed by: NURSE PRACTITIONER

## 2022-01-01 PROCEDURE — 94002 VENT MGMT INPAT INIT DAY: CPT

## 2022-01-01 PROCEDURE — 87077 CULTURE AEROBIC IDENTIFY: CPT

## 2022-01-01 PROCEDURE — 99233 SBSQ HOSP IP/OBS HIGH 50: CPT | Performed by: INTERNAL MEDICINE

## 2022-01-01 PROCEDURE — 84100 ASSAY OF PHOSPHORUS: CPT

## 2022-01-01 PROCEDURE — 99223 1ST HOSP IP/OBS HIGH 75: CPT | Performed by: INTERNAL MEDICINE

## 2022-01-01 PROCEDURE — 74176 CT ABD & PELVIS W/O CONTRAST: CPT

## 2022-01-01 PROCEDURE — 93306 TTE W/DOPPLER COMPLETE: CPT

## 2022-01-01 PROCEDURE — 94660 CPAP INITIATION&MGMT: CPT

## 2022-01-01 PROCEDURE — 74011000636 HC RX REV CODE- 636: Performed by: EMERGENCY MEDICINE

## 2022-01-01 PROCEDURE — 99239 HOSP IP/OBS DSCHRG MGMT >30: CPT | Performed by: PHYSICIAN ASSISTANT

## 2022-01-01 PROCEDURE — 80202 ASSAY OF VANCOMYCIN: CPT

## 2022-01-01 PROCEDURE — 03HY32Z INSERTION OF MONITORING DEVICE INTO UPPER ARTERY, PERCUTANEOUS APPROACH: ICD-10-PCS | Performed by: INTERNAL MEDICINE

## 2022-01-01 PROCEDURE — 74011250637 HC RX REV CODE- 250/637: Performed by: EMERGENCY MEDICINE

## 2022-01-01 RX ORDER — HEPARIN SODIUM 1000 [USP'U]/ML
3000 INJECTION, SOLUTION INTRAVENOUS; SUBCUTANEOUS ONCE
Status: COMPLETED | OUTPATIENT
Start: 2022-01-01 | End: 2022-01-01

## 2022-01-01 RX ORDER — HEPARIN SODIUM 1000 [USP'U]/ML
5000 INJECTION, SOLUTION INTRAVENOUS; SUBCUTANEOUS ONCE
Status: DISCONTINUED | OUTPATIENT
Start: 2022-01-01 | End: 2022-01-01

## 2022-01-01 RX ORDER — POTASSIUM CHLORIDE 7.45 MG/ML
10 INJECTION INTRAVENOUS
Status: DISCONTINUED | OUTPATIENT
Start: 2022-01-01 | End: 2022-01-01

## 2022-01-01 RX ORDER — GUAIFENESIN 100 MG/5ML
162 LIQUID (ML) ORAL
Status: COMPLETED | OUTPATIENT
Start: 2022-01-01 | End: 2022-01-01

## 2022-01-01 RX ORDER — POTASSIUM CHLORIDE 7.45 MG/ML
10 INJECTION INTRAVENOUS
Status: COMPLETED | OUTPATIENT
Start: 2022-01-01 | End: 2022-01-01

## 2022-01-01 RX ORDER — HEPARIN SODIUM 10000 [USP'U]/100ML
11.79-27 INJECTION, SOLUTION INTRAVENOUS
Status: DISCONTINUED | OUTPATIENT
Start: 2022-01-01 | End: 2022-10-26 | Stop reason: HOSPADM

## 2022-01-01 RX ORDER — CHOLECALCIFEROL (VITAMIN D3) 125 MCG
10 CAPSULE ORAL
Status: DISCONTINUED | OUTPATIENT
Start: 2022-01-01 | End: 2022-10-26 | Stop reason: HOSPADM

## 2022-01-01 RX ORDER — GUAIFENESIN 100 MG/5ML
81 LIQUID (ML) ORAL
Status: DISCONTINUED | OUTPATIENT
Start: 2022-01-01 | End: 2022-01-01

## 2022-01-01 RX ORDER — DEXTROSE MONOHYDRATE 100 MG/ML
0-250 INJECTION, SOLUTION INTRAVENOUS AS NEEDED
Status: DISCONTINUED | OUTPATIENT
Start: 2022-01-01 | End: 2022-10-26 | Stop reason: HOSPADM

## 2022-01-01 RX ORDER — NOREPINEPHRINE BITARTRATE/D5W 8 MG/250ML
.5-5 PLASTIC BAG, INJECTION (ML) INTRAVENOUS
Status: DISCONTINUED | OUTPATIENT
Start: 2022-01-01 | End: 2022-10-26 | Stop reason: HOSPADM

## 2022-01-01 RX ORDER — FUROSEMIDE 10 MG/ML
40 INJECTION INTRAMUSCULAR; INTRAVENOUS 2 TIMES DAILY
Status: DISCONTINUED | OUTPATIENT
Start: 2022-01-01 | End: 2022-01-01

## 2022-01-01 RX ORDER — HEPARIN SODIUM 1000 [USP'U]/ML
4000 INJECTION, SOLUTION INTRAVENOUS; SUBCUTANEOUS ONCE
Status: COMPLETED | OUTPATIENT
Start: 2022-01-01 | End: 2022-01-01

## 2022-01-01 RX ORDER — GUAIFENESIN 100 MG/5ML
81 LIQUID (ML) ORAL DAILY
Status: DISCONTINUED | OUTPATIENT
Start: 2022-01-01 | End: 2022-10-26 | Stop reason: HOSPADM

## 2022-01-01 RX ORDER — CHLORHEXIDINE GLUCONATE 1.2 MG/ML
10 RINSE ORAL EVERY 12 HOURS
Status: DISCONTINUED | OUTPATIENT
Start: 2022-01-01 | End: 2022-10-26 | Stop reason: HOSPADM

## 2022-01-01 RX ORDER — SODIUM CHLORIDE 0.9 % (FLUSH) 0.9 %
5-40 SYRINGE (ML) INJECTION AS NEEDED
Status: DISCONTINUED | OUTPATIENT
Start: 2022-01-01 | End: 2022-10-26 | Stop reason: HOSPADM

## 2022-01-01 RX ORDER — POTASSIUM CHLORIDE 14.9 MG/ML
20 INJECTION INTRAVENOUS ONCE
Status: COMPLETED | OUTPATIENT
Start: 2022-01-01 | End: 2022-01-01

## 2022-01-01 RX ORDER — FUROSEMIDE 10 MG/ML
40 INJECTION INTRAMUSCULAR; INTRAVENOUS ONCE
Status: COMPLETED | OUTPATIENT
Start: 2022-01-01 | End: 2022-01-01

## 2022-01-01 RX ORDER — POTASSIUM CHLORIDE 29.8 MG/ML
20 INJECTION INTRAVENOUS ONCE
Status: COMPLETED | OUTPATIENT
Start: 2022-01-01 | End: 2022-01-01

## 2022-01-01 RX ORDER — ETOMIDATE 2 MG/ML
20 INJECTION INTRAVENOUS ONCE
Status: COMPLETED | OUTPATIENT
Start: 2022-01-01 | End: 2022-01-01

## 2022-01-01 RX ORDER — MAGNESIUM SULFATE HEPTAHYDRATE 40 MG/ML
2 INJECTION, SOLUTION INTRAVENOUS ONCE
Status: COMPLETED | OUTPATIENT
Start: 2022-01-01 | End: 2022-01-01

## 2022-01-01 RX ORDER — ONDANSETRON 4 MG/1
4 TABLET, ORALLY DISINTEGRATING ORAL
Status: DISCONTINUED | OUTPATIENT
Start: 2022-01-01 | End: 2022-10-26 | Stop reason: HOSPADM

## 2022-01-01 RX ORDER — POTASSIUM CHLORIDE 7.45 MG/ML
INJECTION INTRAVENOUS
Status: COMPLETED
Start: 2022-01-01 | End: 2022-01-01

## 2022-01-01 RX ORDER — FUROSEMIDE 10 MG/ML
20 INJECTION INTRAMUSCULAR; INTRAVENOUS DAILY
Status: DISCONTINUED | OUTPATIENT
Start: 2022-10-26 | End: 2022-01-01

## 2022-01-01 RX ORDER — ALBUMIN HUMAN 250 G/1000ML
25 SOLUTION INTRAVENOUS ONCE
Status: COMPLETED | OUTPATIENT
Start: 2022-01-01 | End: 2022-01-01

## 2022-01-01 RX ORDER — HEPARIN SODIUM 10000 [USP'U]/100ML
11.79-25 INJECTION, SOLUTION INTRAVENOUS
Status: DISCONTINUED | OUTPATIENT
Start: 2022-01-01 | End: 2022-01-01

## 2022-01-01 RX ORDER — VANCOMYCIN HYDROCHLORIDE
1250 EVERY 12 HOURS
Status: DISCONTINUED | OUTPATIENT
Start: 2022-01-01 | End: 2022-01-01

## 2022-01-01 RX ORDER — FUROSEMIDE 10 MG/ML
40 INJECTION INTRAMUSCULAR; INTRAVENOUS DAILY
Status: DISCONTINUED | OUTPATIENT
Start: 2022-01-01 | End: 2022-01-01

## 2022-01-01 RX ORDER — MILRINONE LACTATE 0.2 MG/ML
0-.75 INJECTION, SOLUTION INTRAVENOUS
Status: DISCONTINUED | OUTPATIENT
Start: 2022-01-01 | End: 2022-01-01

## 2022-01-01 RX ORDER — SODIUM CHLORIDE 0.9 % (FLUSH) 0.9 %
5-40 SYRINGE (ML) INJECTION EVERY 8 HOURS
Status: DISCONTINUED | OUTPATIENT
Start: 2022-01-01 | End: 2022-10-26 | Stop reason: HOSPADM

## 2022-01-01 RX ORDER — FUROSEMIDE 10 MG/ML
20 INJECTION INTRAMUSCULAR; INTRAVENOUS DAILY
Status: DISCONTINUED | OUTPATIENT
Start: 2022-01-01 | End: 2022-10-26 | Stop reason: HOSPADM

## 2022-01-01 RX ORDER — BUMETANIDE 0.25 MG/ML
1 INJECTION INTRAMUSCULAR; INTRAVENOUS ONCE
Status: COMPLETED | OUTPATIENT
Start: 2022-01-01 | End: 2022-01-01

## 2022-01-01 RX ORDER — AMIODARONE HYDROCHLORIDE 150 MG/3ML
INJECTION, SOLUTION INTRAVENOUS
Status: DISCONTINUED
Start: 2022-01-01 | End: 2022-01-01 | Stop reason: WASHOUT

## 2022-01-01 RX ORDER — ROCURONIUM BROMIDE 10 MG/ML
100 INJECTION, SOLUTION INTRAVENOUS
Status: COMPLETED | OUTPATIENT
Start: 2022-01-01 | End: 2022-01-01

## 2022-01-01 RX ORDER — INSULIN LISPRO 100 [IU]/ML
INJECTION, SOLUTION INTRAVENOUS; SUBCUTANEOUS EVERY 6 HOURS
Status: DISCONTINUED | OUTPATIENT
Start: 2022-01-01 | End: 2022-10-26 | Stop reason: HOSPADM

## 2022-01-01 RX ORDER — FENTANYL CITRATE 50 UG/ML
100 INJECTION, SOLUTION INTRAMUSCULAR; INTRAVENOUS
Status: DISCONTINUED | OUTPATIENT
Start: 2022-01-01 | End: 2022-10-26 | Stop reason: HOSPADM

## 2022-01-01 RX ORDER — EPINEPHRINE 1 MG/ML
INJECTION, SOLUTION, CONCENTRATE INTRAVENOUS
Status: DISPENSED
Start: 2022-01-01 | End: 2022-01-01

## 2022-01-01 RX ORDER — NOREPINEPHRINE BITARTRATE/D5W 8 MG/250ML
.5-5 PLASTIC BAG, INJECTION (ML) INTRAVENOUS
Status: DISCONTINUED | OUTPATIENT
Start: 2022-01-01 | End: 2022-01-01 | Stop reason: DRUGHIGH

## 2022-01-01 RX ORDER — MIDAZOLAM IN 0.9 % SOD.CHLORID 1 MG/ML
0-10 PLASTIC BAG, INJECTION (ML) INTRAVENOUS
Status: DISCONTINUED | OUTPATIENT
Start: 2022-01-01 | End: 2022-01-01

## 2022-01-01 RX ORDER — ATORVASTATIN CALCIUM 40 MG/1
40 TABLET, FILM COATED ORAL
Status: DISCONTINUED | OUTPATIENT
Start: 2022-01-01 | End: 2022-10-26 | Stop reason: HOSPADM

## 2022-01-01 RX ORDER — MAGNESIUM SULFATE 100 %
4 CRYSTALS MISCELLANEOUS AS NEEDED
Status: DISCONTINUED | OUTPATIENT
Start: 2022-01-01 | End: 2022-10-26 | Stop reason: HOSPADM

## 2022-01-01 RX ORDER — ONDANSETRON 2 MG/ML
4 INJECTION INTRAMUSCULAR; INTRAVENOUS
Status: DISCONTINUED | OUTPATIENT
Start: 2022-01-01 | End: 2022-10-26 | Stop reason: HOSPADM

## 2022-01-01 RX ORDER — FUROSEMIDE 10 MG/ML
80 INJECTION INTRAMUSCULAR; INTRAVENOUS ONCE
Status: DISCONTINUED | OUTPATIENT
Start: 2022-01-01 | End: 2022-01-01

## 2022-01-01 RX ORDER — MILRINONE LACTATE 0.2 MG/ML
0.38 INJECTION, SOLUTION INTRAVENOUS CONTINUOUS
Status: DISCONTINUED | OUTPATIENT
Start: 2022-01-01 | End: 2022-01-01

## 2022-01-01 RX ORDER — PROPOFOL 10 MG/ML
INJECTION, EMULSION INTRAVENOUS
Status: COMPLETED
Start: 2022-01-01 | End: 2022-01-01

## 2022-01-01 RX ORDER — ALBUMIN HUMAN 50 G/1000ML
25 SOLUTION INTRAVENOUS ONCE
Status: COMPLETED | OUTPATIENT
Start: 2022-01-01 | End: 2022-01-01

## 2022-01-01 RX ORDER — ACETAMINOPHEN 650 MG/1
650 SUPPOSITORY RECTAL
Status: DISCONTINUED | OUTPATIENT
Start: 2022-01-01 | End: 2022-10-26 | Stop reason: HOSPADM

## 2022-01-01 RX ORDER — HEPARIN SODIUM 10000 [USP'U]/100ML
12-25 INJECTION, SOLUTION INTRAVENOUS
Status: DISCONTINUED | OUTPATIENT
Start: 2022-01-01 | End: 2022-01-01

## 2022-01-01 RX ORDER — NOREPINEPHRINE BITARTRATE/D5W 8 MG/250ML
.5-5 PLASTIC BAG, INJECTION (ML) INTRAVENOUS
Status: DISCONTINUED | OUTPATIENT
Start: 2022-01-01 | End: 2022-01-01

## 2022-01-01 RX ORDER — DEXMEDETOMIDINE HYDROCHLORIDE 4 UG/ML
.1-1.5 INJECTION, SOLUTION INTRAVENOUS
Status: DISCONTINUED | OUTPATIENT
Start: 2022-01-01 | End: 2022-01-01

## 2022-01-01 RX ORDER — VANCOMYCIN 2 GRAM/500 ML IN 0.9 % SODIUM CHLORIDE INTRAVENOUS
2000 ONCE
Status: COMPLETED | OUTPATIENT
Start: 2022-01-01 | End: 2022-01-01

## 2022-01-01 RX ORDER — MIDAZOLAM HYDROCHLORIDE 1 MG/ML
2 INJECTION, SOLUTION INTRAMUSCULAR; INTRAVENOUS
Status: DISCONTINUED | OUTPATIENT
Start: 2022-01-01 | End: 2022-10-26 | Stop reason: HOSPADM

## 2022-01-01 RX ORDER — PROPOFOL 10 MG/ML
0-50 VIAL (ML) INTRAVENOUS
Status: DISCONTINUED | OUTPATIENT
Start: 2022-01-01 | End: 2022-01-01

## 2022-01-01 RX ORDER — MAGNESIUM SULFATE HEPTAHYDRATE 40 MG/ML
4 INJECTION, SOLUTION INTRAVENOUS ONCE
Status: COMPLETED | OUTPATIENT
Start: 2022-01-01 | End: 2022-01-01

## 2022-01-01 RX ADMIN — EPINEPHRINE 4 MCG/MIN: 1 INJECTION INTRAMUSCULAR; INTRAVENOUS; SUBCUTANEOUS at 12:52

## 2022-01-01 RX ADMIN — NOREPINEPHRINE BITARTRATE 50 MCG/MIN: 1 INJECTION, SOLUTION, CONCENTRATE INTRAVENOUS at 15:14

## 2022-01-01 RX ADMIN — FENTANYL CITRATE 100 MCG: 50 INJECTION, SOLUTION INTRAMUSCULAR; INTRAVENOUS at 17:30

## 2022-01-01 RX ADMIN — SODIUM CHLORIDE, PRESERVATIVE FREE 10 ML: 5 INJECTION INTRAVENOUS at 13:43

## 2022-01-01 RX ADMIN — MAGNESIUM SULFATE HEPTAHYDRATE 2 G: 40 INJECTION, SOLUTION INTRAVENOUS at 06:46

## 2022-01-01 RX ADMIN — HEPARIN SODIUM 25 UNITS/KG/HR: 10000 INJECTION, SOLUTION INTRAVENOUS at 20:49

## 2022-01-01 RX ADMIN — SODIUM CHLORIDE, PRESERVATIVE FREE 10 ML: 5 INJECTION INTRAVENOUS at 14:29

## 2022-01-01 RX ADMIN — EPINEPHRINE 2 MCG/MIN: 1 INJECTION INTRAMUSCULAR; INTRAVENOUS; SUBCUTANEOUS at 02:00

## 2022-01-01 RX ADMIN — PIPERACILLIN SODIUM AND TAZOBACTAM SODIUM 3.38 G: 3; .375 INJECTION, POWDER, LYOPHILIZED, FOR SOLUTION INTRAVENOUS at 21:00

## 2022-01-01 RX ADMIN — ATORVASTATIN CALCIUM 40 MG: 40 TABLET, FILM COATED ORAL at 21:05

## 2022-01-01 RX ADMIN — Medication 175 MCG/HR: at 08:23

## 2022-01-01 RX ADMIN — SODIUM CHLORIDE, PRESERVATIVE FREE 10 ML: 5 INJECTION INTRAVENOUS at 13:32

## 2022-01-01 RX ADMIN — HEPARIN SODIUM 3000 UNITS: 1000 INJECTION INTRAVENOUS; SUBCUTANEOUS at 22:27

## 2022-01-01 RX ADMIN — HEPARIN SODIUM 27 UNITS/KG/HR: 10000 INJECTION, SOLUTION INTRAVENOUS at 10:30

## 2022-01-01 RX ADMIN — FUROSEMIDE 10 MG/HR: 100 INJECTION, SOLUTION INTRAMUSCULAR; INTRAVENOUS at 09:12

## 2022-01-01 RX ADMIN — VASOPRESSIN 0.03 UNITS/MIN: 20 INJECTION INTRAVENOUS at 09:23

## 2022-01-01 RX ADMIN — VASOPRESSIN 0.03 UNITS/MIN: 20 INJECTION INTRAVENOUS at 12:14

## 2022-01-01 RX ADMIN — AMIODARONE HYDROCHLORIDE 0.5 MG/MIN: 1.8 INJECTION, SOLUTION INTRAVENOUS at 10:17

## 2022-01-01 RX ADMIN — Medication 150 MCG/HR: at 18:16

## 2022-01-01 RX ADMIN — PROPOFOL INJECTABLE EMULSION 10 MCG/KG/MIN: 10 INJECTION, EMULSION INTRAVENOUS at 00:22

## 2022-01-01 RX ADMIN — FUROSEMIDE 40 MG: 10 INJECTION INTRAMUSCULAR; INTRAVENOUS at 01:17

## 2022-01-01 RX ADMIN — VASOPRESSIN 0.03 UNITS/MIN: 20 INJECTION INTRAVENOUS at 10:42

## 2022-01-01 RX ADMIN — FUROSEMIDE 20 MG: 10 INJECTION, SOLUTION INTRAMUSCULAR; INTRAVENOUS at 16:38

## 2022-01-01 RX ADMIN — CHLORHEXIDINE GLUCONATE 0.12% ORAL RINSE 10 ML: 1.2 LIQUID ORAL at 21:04

## 2022-01-01 RX ADMIN — MILRINONE LACTATE IN DEXTROSE 0.38 MCG/KG/MIN: 200 INJECTION, SOLUTION INTRAVENOUS at 08:24

## 2022-01-01 RX ADMIN — HEPARIN SODIUM 11.79 UNITS/KG/HR: 10000 INJECTION, SOLUTION INTRAVENOUS at 22:43

## 2022-01-01 RX ADMIN — Medication 175 MCG/HR: at 18:00

## 2022-01-01 RX ADMIN — CHLORHEXIDINE GLUCONATE 0.12% ORAL RINSE 10 ML: 1.2 LIQUID ORAL at 20:30

## 2022-01-01 RX ADMIN — PIPERACILLIN AND TAZOBACTAM 4.5 G: 4; .5 INJECTION, POWDER, FOR SOLUTION INTRAVENOUS at 16:35

## 2022-01-01 RX ADMIN — SODIUM CHLORIDE, PRESERVATIVE FREE 10 ML: 5 INJECTION INTRAVENOUS at 21:06

## 2022-01-01 RX ADMIN — HEPARIN SODIUM 25 UNITS/KG/HR: 10000 INJECTION, SOLUTION INTRAVENOUS at 08:11

## 2022-01-01 RX ADMIN — NOREPINEPHRINE BITARTRATE 4 MCG/MIN: 1 INJECTION, SOLUTION, CONCENTRATE INTRAVENOUS at 09:22

## 2022-01-01 RX ADMIN — HEPARIN SODIUM 3000 UNITS: 1000 INJECTION INTRAVENOUS; SUBCUTANEOUS at 05:18

## 2022-01-01 RX ADMIN — POTASSIUM CHLORIDE 10 MEQ: 7.46 INJECTION, SOLUTION INTRAVENOUS at 09:09

## 2022-01-01 RX ADMIN — SODIUM CHLORIDE, PRESERVATIVE FREE 20 MG: 5 INJECTION INTRAVENOUS at 09:09

## 2022-01-01 RX ADMIN — POTASSIUM CHLORIDE 20 MEQ: 29.8 INJECTION INTRAVENOUS at 14:57

## 2022-01-01 RX ADMIN — CHLORHEXIDINE GLUCONATE 0.12% ORAL RINSE 10 ML: 1.2 LIQUID ORAL at 08:54

## 2022-01-01 RX ADMIN — SODIUM CHLORIDE, PRESERVATIVE FREE 10 ML: 5 INJECTION INTRAVENOUS at 05:56

## 2022-01-01 RX ADMIN — BUMETANIDE 1 MG: 0.25 INJECTION INTRAMUSCULAR; INTRAVENOUS at 11:19

## 2022-01-01 RX ADMIN — HEPARIN SODIUM 3000 UNITS: 1000 INJECTION INTRAVENOUS; SUBCUTANEOUS at 00:50

## 2022-01-01 RX ADMIN — NOREPINEPHRINE BITARTRATE 50 MCG/MIN: 1 INJECTION, SOLUTION, CONCENTRATE INTRAVENOUS at 03:08

## 2022-01-01 RX ADMIN — SODIUM CHLORIDE, PRESERVATIVE FREE 20 MG: 5 INJECTION INTRAVENOUS at 08:01

## 2022-01-01 RX ADMIN — SODIUM CHLORIDE, PRESERVATIVE FREE 10 ML: 5 INJECTION INTRAVENOUS at 21:15

## 2022-01-01 RX ADMIN — HEPARIN SODIUM 25 UNITS/KG/HR: 10000 INJECTION, SOLUTION INTRAVENOUS at 08:32

## 2022-01-01 RX ADMIN — POTASSIUM CHLORIDE 10 MEQ: 7.46 INJECTION, SOLUTION INTRAVENOUS at 10:20

## 2022-01-01 RX ADMIN — SODIUM CHLORIDE, PRESERVATIVE FREE 20 MG: 5 INJECTION INTRAVENOUS at 08:54

## 2022-01-01 RX ADMIN — VANCOMYCIN HYDROCHLORIDE 2000 MG: 10 INJECTION, POWDER, LYOPHILIZED, FOR SOLUTION INTRAVENOUS at 19:00

## 2022-01-01 RX ADMIN — AMIODARONE HYDROCHLORIDE 150 MG: 1.5 INJECTION, SOLUTION INTRAVENOUS at 17:38

## 2022-01-01 RX ADMIN — Medication 175 MCG/HR: at 23:21

## 2022-01-01 RX ADMIN — POTASSIUM CHLORIDE 10 MEQ: 7.46 INJECTION, SOLUTION INTRAVENOUS at 04:11

## 2022-01-01 RX ADMIN — Medication 2 UNITS: at 05:59

## 2022-01-01 RX ADMIN — FUROSEMIDE 40 MG: 10 INJECTION, SOLUTION INTRAMUSCULAR; INTRAVENOUS at 13:32

## 2022-01-01 RX ADMIN — CHLORHEXIDINE GLUCONATE 0.12% ORAL RINSE 10 ML: 1.2 LIQUID ORAL at 08:22

## 2022-01-01 RX ADMIN — VASOPRESSIN 0.03 UNITS/MIN: 20 INJECTION INTRAVENOUS at 12:00

## 2022-01-01 RX ADMIN — VASOPRESSIN 0.03 UNITS/MIN: 20 INJECTION INTRAVENOUS at 13:34

## 2022-01-01 RX ADMIN — HEPARIN SODIUM 3000 UNITS: 1000 INJECTION INTRAVENOUS; SUBCUTANEOUS at 06:16

## 2022-01-01 RX ADMIN — MAGNESIUM SULFATE IN WATER 4 G: 40 INJECTION, SOLUTION INTRAVENOUS at 04:10

## 2022-01-01 RX ADMIN — NOREPINEPHRINE BITARTRATE 25 MCG/MIN: 1 INJECTION, SOLUTION, CONCENTRATE INTRAVENOUS at 17:33

## 2022-01-01 RX ADMIN — VASOPRESSIN 0.03 UNITS/MIN: 20 INJECTION INTRAVENOUS at 10:35

## 2022-01-01 RX ADMIN — PIPERACILLIN SODIUM AND TAZOBACTAM SODIUM 3.38 G: 3; .375 INJECTION, POWDER, LYOPHILIZED, FOR SOLUTION INTRAVENOUS at 06:55

## 2022-01-01 RX ADMIN — SODIUM CHLORIDE, PRESERVATIVE FREE 20 MG: 5 INJECTION INTRAVENOUS at 20:53

## 2022-01-01 RX ADMIN — Medication 200 MCG/HR: at 10:55

## 2022-01-01 RX ADMIN — ASPIRIN 81 MG 81 MG: 81 TABLET ORAL at 08:22

## 2022-01-01 RX ADMIN — POTASSIUM CHLORIDE 10 MEQ: 7.46 INJECTION, SOLUTION INTRAVENOUS at 07:04

## 2022-01-01 RX ADMIN — POTASSIUM BICARBONATE 40 MEQ: 782 TABLET, EFFERVESCENT ORAL at 08:22

## 2022-01-01 RX ADMIN — DEXMEDETOMIDINE HYDROCHLORIDE 0.4 MCG/KG/HR: 4 INJECTION, SOLUTION INTRAVENOUS at 15:54

## 2022-01-01 RX ADMIN — FENTANYL CITRATE 100 MCG: 50 INJECTION, SOLUTION INTRAMUSCULAR; INTRAVENOUS at 04:16

## 2022-01-01 RX ADMIN — POTASSIUM CHLORIDE 10 MEQ: 7.46 INJECTION, SOLUTION INTRAVENOUS at 16:46

## 2022-01-01 RX ADMIN — ASPIRIN 81 MG 81 MG: 81 TABLET ORAL at 10:41

## 2022-01-01 RX ADMIN — VASOPRESSIN 0.03 UNITS/MIN: 20 INJECTION INTRAVENOUS at 22:11

## 2022-01-01 RX ADMIN — CHLORHEXIDINE GLUCONATE 0.12% ORAL RINSE 10 ML: 1.2 LIQUID ORAL at 04:10

## 2022-01-01 RX ADMIN — VANCOMYCIN HYDROCHLORIDE 1000 MG: 1 INJECTION, POWDER, LYOPHILIZED, FOR SOLUTION INTRAVENOUS at 17:28

## 2022-01-01 RX ADMIN — CHLORHEXIDINE GLUCONATE 0.12% ORAL RINSE 10 ML: 1.2 LIQUID ORAL at 08:01

## 2022-01-01 RX ADMIN — FENTANYL CITRATE 100 MCG: 50 INJECTION, SOLUTION INTRAMUSCULAR; INTRAVENOUS at 17:20

## 2022-01-01 RX ADMIN — SODIUM CHLORIDE, PRESERVATIVE FREE 20 MG: 5 INJECTION INTRAVENOUS at 21:15

## 2022-01-01 RX ADMIN — SODIUM CHLORIDE, PRESERVATIVE FREE 20 MG: 5 INJECTION INTRAVENOUS at 20:32

## 2022-01-01 RX ADMIN — NOREPINEPHRINE BITARTRATE 50 MCG/MIN: 1 INJECTION, SOLUTION, CONCENTRATE INTRAVENOUS at 15:51

## 2022-01-01 RX ADMIN — FUROSEMIDE 40 MG: 10 INJECTION, SOLUTION INTRAMUSCULAR; INTRAVENOUS at 18:09

## 2022-01-01 RX ADMIN — Medication 175 MCG/HR: at 14:45

## 2022-01-01 RX ADMIN — HEPARIN SODIUM 4000 UNITS: 1000 INJECTION INTRAVENOUS; SUBCUTANEOUS at 22:44

## 2022-01-01 RX ADMIN — SODIUM CHLORIDE, PRESERVATIVE FREE 10 ML: 5 INJECTION INTRAVENOUS at 01:29

## 2022-01-01 RX ADMIN — SODIUM CHLORIDE 2 MG/HR: 900 INJECTION, SOLUTION INTRAVENOUS at 06:42

## 2022-01-01 RX ADMIN — ASPIRIN 81 MG 81 MG: 81 TABLET ORAL at 09:55

## 2022-01-01 RX ADMIN — SODIUM PHOSPHATE, MONOBASIC, MONOHYDRATE AND SODIUM PHOSPHATE, DIBASIC, ANHYDROUS 9 MMOL: 142; 276 INJECTION, SOLUTION INTRAVENOUS at 18:19

## 2022-01-01 RX ADMIN — SODIUM CHLORIDE, PRESERVATIVE FREE 20 MG: 5 INJECTION INTRAVENOUS at 02:03

## 2022-01-01 RX ADMIN — SODIUM CHLORIDE, PRESERVATIVE FREE 10 ML: 5 INJECTION INTRAVENOUS at 14:00

## 2022-01-01 RX ADMIN — CHLORHEXIDINE GLUCONATE 0.12% ORAL RINSE 10 ML: 1.2 LIQUID ORAL at 09:55

## 2022-01-01 RX ADMIN — POTASSIUM BICARBONATE 40 MEQ: 782 TABLET, EFFERVESCENT ORAL at 06:08

## 2022-01-01 RX ADMIN — ASPIRIN 81 MG 162 MG: 81 TABLET ORAL at 21:10

## 2022-01-01 RX ADMIN — POTASSIUM CHLORIDE 20 MEQ: 14.9 INJECTION, SOLUTION INTRAVENOUS at 09:22

## 2022-01-01 RX ADMIN — CHLORHEXIDINE GLUCONATE 0.12% ORAL RINSE 10 ML: 1.2 LIQUID ORAL at 20:50

## 2022-01-01 RX ADMIN — HEPARIN SODIUM 3000 UNITS: 1000 INJECTION INTRAVENOUS; SUBCUTANEOUS at 13:48

## 2022-01-01 RX ADMIN — Medication 2 UNITS: at 12:40

## 2022-01-01 RX ADMIN — ALBUMIN (HUMAN) 25 G: 12.5 INJECTION, SOLUTION INTRAVENOUS at 09:08

## 2022-01-01 RX ADMIN — PIPERACILLIN SODIUM AND TAZOBACTAM SODIUM 3.38 G: 3; .375 INJECTION, POWDER, LYOPHILIZED, FOR SOLUTION INTRAVENOUS at 05:44

## 2022-01-01 RX ADMIN — SODIUM CHLORIDE, PRESERVATIVE FREE 10 ML: 5 INJECTION INTRAVENOUS at 22:00

## 2022-01-01 RX ADMIN — AMIODARONE HYDROCHLORIDE 0.5 MG/MIN: 1.8 INJECTION, SOLUTION INTRAVENOUS at 21:44

## 2022-01-01 RX ADMIN — ATORVASTATIN CALCIUM 40 MG: 40 TABLET, FILM COATED ORAL at 21:01

## 2022-01-01 RX ADMIN — VASOPRESSIN 0.03 UNITS/MIN: 20 INJECTION INTRAVENOUS at 12:15

## 2022-01-01 RX ADMIN — NOREPINEPHRINE BITARTRATE 36 MCG/MIN: 1 INJECTION, SOLUTION, CONCENTRATE INTRAVENOUS at 17:30

## 2022-01-01 RX ADMIN — SODIUM CHLORIDE, PRESERVATIVE FREE 20 MG: 5 INJECTION INTRAVENOUS at 10:41

## 2022-01-01 RX ADMIN — NOREPINEPHRINE BITARTRATE 45 MCG/MIN: 1 INJECTION, SOLUTION, CONCENTRATE INTRAVENOUS at 03:56

## 2022-01-01 RX ADMIN — EPINEPHRINE 4 MCG/MIN: 1 INJECTION INTRAMUSCULAR; INTRAVENOUS; SUBCUTANEOUS at 12:00

## 2022-01-01 RX ADMIN — SODIUM CHLORIDE, PRESERVATIVE FREE 10 ML: 5 INJECTION INTRAVENOUS at 15:23

## 2022-01-01 RX ADMIN — HEPARIN SODIUM 3000 UNITS: 1000 INJECTION INTRAVENOUS; SUBCUTANEOUS at 15:21

## 2022-01-01 RX ADMIN — FENTANYL CITRATE 50 MCG/HR: 0.05 INJECTION, SOLUTION INTRAMUSCULAR; INTRAVENOUS at 17:54

## 2022-01-01 RX ADMIN — POTASSIUM CHLORIDE 10 MEQ: 7.46 INJECTION, SOLUTION INTRAVENOUS at 17:39

## 2022-01-01 RX ADMIN — IOPAMIDOL 100 ML: 755 INJECTION, SOLUTION INTRAVENOUS at 03:27

## 2022-01-01 RX ADMIN — SODIUM CHLORIDE, PRESERVATIVE FREE 10 ML: 5 INJECTION INTRAVENOUS at 22:24

## 2022-01-01 RX ADMIN — HEPARIN SODIUM 3000 UNITS: 1000 INJECTION INTRAVENOUS; SUBCUTANEOUS at 04:18

## 2022-01-01 RX ADMIN — CHLORHEXIDINE GLUCONATE 0.12% ORAL RINSE 10 ML: 1.2 LIQUID ORAL at 21:14

## 2022-01-01 RX ADMIN — HEPARIN SODIUM 27 UNITS/KG/HR: 10000 INJECTION, SOLUTION INTRAVENOUS at 01:14

## 2022-01-01 RX ADMIN — HEPARIN SODIUM 22 UNITS/KG/HR: 10000 INJECTION, SOLUTION INTRAVENOUS at 18:18

## 2022-01-01 RX ADMIN — SODIUM CHLORIDE, PRESERVATIVE FREE 20 MG: 5 INJECTION INTRAVENOUS at 04:09

## 2022-01-01 RX ADMIN — Medication 175 MCG/HR: at 09:42

## 2022-01-01 RX ADMIN — HEPARIN SODIUM 3000 UNITS: 1000 INJECTION INTRAVENOUS; SUBCUTANEOUS at 05:33

## 2022-01-01 RX ADMIN — VANCOMYCIN HYDROCHLORIDE 1000 MG: 1 INJECTION, POWDER, LYOPHILIZED, FOR SOLUTION INTRAVENOUS at 05:44

## 2022-01-01 RX ADMIN — MIDAZOLAM HYDROCHLORIDE 2 MG: 2 INJECTION, SOLUTION INTRAMUSCULAR; INTRAVENOUS at 14:02

## 2022-01-01 RX ADMIN — FUROSEMIDE 20 MG/HR: 100 INJECTION, SOLUTION INTRAMUSCULAR; INTRAVENOUS at 01:36

## 2022-01-01 RX ADMIN — SODIUM CHLORIDE, PRESERVATIVE FREE 10 ML: 5 INJECTION INTRAVENOUS at 15:19

## 2022-01-01 RX ADMIN — VASOPRESSIN 0.03 UNITS/MIN: 20 INJECTION INTRAVENOUS at 23:28

## 2022-01-01 RX ADMIN — HEPARIN SODIUM 13.79 UNITS/KG/HR: 10000 INJECTION, SOLUTION INTRAVENOUS at 07:00

## 2022-01-01 RX ADMIN — SODIUM CHLORIDE, PRESERVATIVE FREE 10 ML: 5 INJECTION INTRAVENOUS at 06:00

## 2022-01-01 RX ADMIN — POTASSIUM CHLORIDE 10 MEQ: 7.46 INJECTION, SOLUTION INTRAVENOUS at 08:23

## 2022-01-01 RX ADMIN — VASOPRESSIN 0.03 UNITS/MIN: 20 INJECTION INTRAVENOUS at 22:30

## 2022-01-01 RX ADMIN — SODIUM CHLORIDE, PRESERVATIVE FREE 20 MG: 5 INJECTION INTRAVENOUS at 08:22

## 2022-01-01 RX ADMIN — CHLORHEXIDINE GLUCONATE 0.12% ORAL RINSE 10 ML: 1.2 LIQUID ORAL at 09:09

## 2022-01-01 RX ADMIN — POTASSIUM PHOSPHATE, MONOBASIC POTASSIUM PHOSPHATE, DIBASIC: 224; 236 INJECTION, SOLUTION, CONCENTRATE INTRAVENOUS at 08:55

## 2022-01-01 RX ADMIN — ALBUMIN (HUMAN) 25 G: 0.25 INJECTION, SOLUTION INTRAVENOUS at 15:51

## 2022-01-01 RX ADMIN — THIAMINE HYDROCHLORIDE 200 MG: 100 INJECTION, SOLUTION INTRAMUSCULAR; INTRAVENOUS at 14:54

## 2022-01-01 RX ADMIN — AMIODARONE HYDROCHLORIDE 0.5 MG/MIN: 1.8 INJECTION, SOLUTION INTRAVENOUS at 10:35

## 2022-01-01 RX ADMIN — Medication 50 MCG/HR: at 03:45

## 2022-01-01 RX ADMIN — PIPERACILLIN SODIUM AND TAZOBACTAM SODIUM 3.38 G: 3; .375 INJECTION, POWDER, LYOPHILIZED, FOR SOLUTION INTRAVENOUS at 05:00

## 2022-01-01 RX ADMIN — CHLORHEXIDINE GLUCONATE 0.12% ORAL RINSE 10 ML: 1.2 LIQUID ORAL at 20:31

## 2022-01-01 RX ADMIN — SODIUM CHLORIDE, PRESERVATIVE FREE 10 ML: 5 INJECTION INTRAVENOUS at 21:02

## 2022-01-01 RX ADMIN — ASPIRIN 81 MG 81 MG: 81 TABLET ORAL at 15:22

## 2022-01-01 RX ADMIN — HEPARIN SODIUM 27 UNITS/KG/HR: 10000 INJECTION, SOLUTION INTRAVENOUS at 21:17

## 2022-01-01 RX ADMIN — FENTANYL CITRATE 100 MCG: 50 INJECTION, SOLUTION INTRAMUSCULAR; INTRAVENOUS at 13:14

## 2022-01-01 RX ADMIN — Medication 200 MCG/HR: at 17:34

## 2022-01-01 RX ADMIN — Medication 200 MCG/HR: at 08:07

## 2022-01-01 RX ADMIN — ACETAMINOPHEN 650 MG: 650 SUPPOSITORY RECTAL at 23:28

## 2022-01-01 RX ADMIN — SODIUM CHLORIDE, PRESERVATIVE FREE 10 ML: 5 INJECTION INTRAVENOUS at 05:45

## 2022-01-01 RX ADMIN — VASOPRESSIN 0.03 UNITS/MIN: 20 INJECTION INTRAVENOUS at 21:04

## 2022-01-01 RX ADMIN — DEXMEDETOMIDINE HYDROCHLORIDE 0.5 MCG/KG/HR: 4 INJECTION, SOLUTION INTRAVENOUS at 14:56

## 2022-01-01 RX ADMIN — Medication 175 MCG/HR: at 08:24

## 2022-01-01 RX ADMIN — SODIUM CHLORIDE, PRESERVATIVE FREE 10 ML: 5 INJECTION INTRAVENOUS at 05:24

## 2022-01-01 RX ADMIN — SODIUM CHLORIDE, PRESERVATIVE FREE 20 MG: 5 INJECTION INTRAVENOUS at 09:55

## 2022-01-01 RX ADMIN — CHLORHEXIDINE GLUCONATE 0.12% ORAL RINSE 10 ML: 1.2 LIQUID ORAL at 10:41

## 2022-01-01 RX ADMIN — EPINEPHRINE 4 MCG/MIN: 1 INJECTION INTRAMUSCULAR; INTRAVENOUS; SUBCUTANEOUS at 18:09

## 2022-01-01 RX ADMIN — POTASSIUM CHLORIDE 20 MEQ: 14.9 INJECTION, SOLUTION INTRAVENOUS at 11:29

## 2022-01-01 RX ADMIN — Medication 4 MCG/MIN: at 05:02

## 2022-01-01 RX ADMIN — DEXTROSE MONOHYDRATE 36 MCG/MIN: 50 INJECTION, SOLUTION INTRAVENOUS at 13:14

## 2022-01-01 RX ADMIN — EPINEPHRINE 3 MCG/MIN: 1 INJECTION INTRAMUSCULAR; INTRAVENOUS; SUBCUTANEOUS at 04:00

## 2022-01-01 RX ADMIN — ROCURONIUM BROMIDE 100 MG: 50 INJECTION INTRAVENOUS at 00:03

## 2022-01-01 RX ADMIN — SODIUM CHLORIDE 2 MG/HR: 900 INJECTION, SOLUTION INTRAVENOUS at 14:40

## 2022-01-01 RX ADMIN — Medication 4 MCG/MIN: at 08:17

## 2022-01-01 RX ADMIN — EPINEPHRINE 4 MCG/MIN: 1 INJECTION INTRAMUSCULAR; INTRAVENOUS; SUBCUTANEOUS at 03:07

## 2022-01-01 RX ADMIN — ACETAMINOPHEN 650 MG: 650 SUPPOSITORY RECTAL at 23:41

## 2022-01-01 RX ADMIN — ASPIRIN 81 MG 81 MG: 81 TABLET ORAL at 08:54

## 2022-01-01 RX ADMIN — FENTANYL CITRATE 100 MCG: 50 INJECTION, SOLUTION INTRAMUSCULAR; INTRAVENOUS at 03:10

## 2022-01-01 RX ADMIN — Medication 10 MCG/KG/MIN: at 00:22

## 2022-01-01 RX ADMIN — AMIODARONE HYDROCHLORIDE 0.5 MG/MIN: 1.8 INJECTION, SOLUTION INTRAVENOUS at 02:00

## 2022-01-01 RX ADMIN — SODIUM CHLORIDE, PRESERVATIVE FREE 20 MG: 5 INJECTION INTRAVENOUS at 21:05

## 2022-01-01 RX ADMIN — PIPERACILLIN SODIUM AND TAZOBACTAM SODIUM 3.38 G: 3; .375 INJECTION, POWDER, LYOPHILIZED, FOR SOLUTION INTRAVENOUS at 13:58

## 2022-01-01 RX ADMIN — VASOPRESSIN 0.03 UNITS/MIN: 20 INJECTION INTRAVENOUS at 00:03

## 2022-01-01 RX ADMIN — Medication 175 MCG/HR: at 00:56

## 2022-01-01 RX ADMIN — AMIODARONE HYDROCHLORIDE 1 MG/MIN: 1.8 INJECTION, SOLUTION INTRAVENOUS at 07:01

## 2022-01-01 RX ADMIN — PIPERACILLIN SODIUM AND TAZOBACTAM SODIUM 3.38 G: 3; .375 INJECTION, POWDER, LYOPHILIZED, FOR SOLUTION INTRAVENOUS at 21:05

## 2022-01-01 RX ADMIN — AMIODARONE HYDROCHLORIDE 1 MG/MIN: 1.8 INJECTION, SOLUTION INTRAVENOUS at 21:03

## 2022-01-01 RX ADMIN — HEPARIN SODIUM 27 UNITS/KG/HR: 10000 INJECTION, SOLUTION INTRAVENOUS at 11:29

## 2022-01-01 RX ADMIN — ETOMIDATE 20 MG: 2 INJECTION, SOLUTION INTRAVENOUS at 00:02

## 2022-01-01 RX ADMIN — PIPERACILLIN SODIUM AND TAZOBACTAM SODIUM 3.38 G: 3; .375 INJECTION, POWDER, LYOPHILIZED, FOR SOLUTION INTRAVENOUS at 23:00

## 2022-01-01 RX ADMIN — DEXMEDETOMIDINE HYDROCHLORIDE 0.4 MCG/KG/HR: 4 INJECTION, SOLUTION INTRAVENOUS at 10:18

## 2022-01-01 RX ADMIN — PIPERACILLIN SODIUM AND TAZOBACTAM SODIUM 3.38 G: 3; .375 INJECTION, POWDER, LYOPHILIZED, FOR SOLUTION INTRAVENOUS at 13:43

## 2022-01-01 RX ADMIN — Medication 200 MCG/HR: at 00:57

## 2022-01-01 RX ADMIN — POTASSIUM CHLORIDE 10 MEQ: 7.46 INJECTION, SOLUTION INTRAVENOUS at 10:21

## 2022-01-01 RX ADMIN — PIPERACILLIN SODIUM AND TAZOBACTAM SODIUM 3.38 G: 3; .375 INJECTION, POWDER, LYOPHILIZED, FOR SOLUTION INTRAVENOUS at 14:28

## 2022-01-01 RX ADMIN — ALBUMIN (HUMAN) 25 G: 0.25 INJECTION, SOLUTION INTRAVENOUS at 09:55

## 2022-01-01 RX ADMIN — FUROSEMIDE 40 MG: 10 INJECTION, SOLUTION INTRAMUSCULAR; INTRAVENOUS at 22:44

## 2022-01-01 RX ADMIN — ATORVASTATIN CALCIUM 40 MG: 40 TABLET, FILM COATED ORAL at 21:14

## 2022-01-01 RX ADMIN — SODIUM CHLORIDE 2 MG/HR: 900 INJECTION, SOLUTION INTRAVENOUS at 03:56

## 2022-01-01 RX ADMIN — VANCOMYCIN HYDROCHLORIDE 1000 MG: 1 INJECTION, POWDER, LYOPHILIZED, FOR SOLUTION INTRAVENOUS at 06:47

## 2022-01-01 RX ADMIN — Medication 175 MCG/HR: at 02:00

## 2022-01-01 RX ADMIN — POTASSIUM CHLORIDE 10 MEQ: 7.46 INJECTION, SOLUTION INTRAVENOUS at 06:03

## 2022-01-01 RX ADMIN — AMIODARONE HYDROCHLORIDE 1 MG/MIN: 1.8 INJECTION, SOLUTION INTRAVENOUS at 00:59

## 2022-01-01 RX ADMIN — Medication 175 MCG/HR: at 16:28

## 2022-01-01 RX ADMIN — SODIUM CHLORIDE, PRESERVATIVE FREE 10 ML: 5 INJECTION INTRAVENOUS at 06:05

## 2022-01-01 RX ADMIN — SODIUM CHLORIDE 2 MG/HR: 900 INJECTION, SOLUTION INTRAVENOUS at 18:35

## 2022-01-01 RX ADMIN — ASPIRIN 81 MG 81 MG: 81 TABLET ORAL at 08:01

## 2022-01-01 RX ADMIN — Medication 150 MCG/HR: at 04:30

## 2022-01-01 RX ADMIN — HEPARIN SODIUM 17.79 UNITS/KG/HR: 10000 INJECTION, SOLUTION INTRAVENOUS at 03:06

## 2022-10-19 NOTE — PROGRESS NOTES
10/19/22 1850   Oxygen Therapy   O2 Sat (%) 94 %   Pulse via Oximetry 97 beats per minute   O2 Device Heated; Hi flow nasal cannula   O2 Flow Rate (L/min) 40 l/min   O2 Temperature 91.4 °F (33 °C)   FIO2 (%) 100 %       Patient placed on HHFNC after ABG drawn

## 2022-10-19 NOTE — Clinical Note
Status[de-identified] INPATIENT [101]   Type of Bed: Intensive Care [6]   Cardiac Monitoring Required?: Yes   Inpatient Hospitalization Certified Necessary for the Following Reasons: 4.  Patient requires ICU level of care interventions (further clarification in H&P documentation)   Admitting Diagnosis: Respiratory failure with hypoxia [facility]) [[id number]]   Admitting Physician: Laura Cook [[id number]]   Attending Physician: Donta Banerjee   Estimated Length of Stay: 2 Midnights   Discharge Plan[de-identified] Home with Office Follow-up

## 2022-10-19 NOTE — ED PROVIDER NOTES
The patient is a 59-year-old male with past medical history significant for DVT and an NSTEMI in July, where he was at [facility] and was in cardiogenic shock. He had pump placed. He was discharged approximately 1 month later. He has been doing fine since then but yesterday he became short of breath last night and into the night into this morning. His shortness of breath is worse with exertion and better with rest.    He arrived in triage with an oxygen saturation of 76%. They immediately brought him back to a bed in the ED and put him on a nonrebreather. The patient states that he felt like he had a fever last night. He has had an occasional cough but denies any other symptoms, specifically chest pain, nausea, vomiting, diaphoresis or lightheadedness. Past Medical History:   Diagnosis Date    Elevated PSA     Mental health disorder        Past Surgical History:   Procedure Laterality Date    HX COLONOSCOPY           Family History:   Family history unknown: Yes       Social History     Socioeconomic History    Marital status: SINGLE     Spouse name: Not on file    Number of children: Not on file    Years of education: Not on file    Highest education level: Not on file   Occupational History    Not on file   Tobacco Use    Smoking status: Every Day    Smokeless tobacco: Not on file   Substance and Sexual Activity    Alcohol use: No    Drug use: No    Sexual activity: Not on file   Other Topics Concern    Not on file   Social History Narrative    Not on file     Social Determinants of Health     Financial Resource Strain: Not on file   Food Insecurity: Not on file   Transportation Needs: Not on file   Physical Activity: Not on file   Stress: Not on file   Social Connections: Not on file   Intimate Partner Violence: Not on file   Housing Stability: Not on file         ALLERGIES: Patient has no known allergies. Review of Systems   All other systems reviewed and are negative.     Vitals: 10/19/22 1818 10/19/22 1847 10/19/22 1850 10/19/22 1917   BP: 105/75   109/77   Pulse: 98   95   Resp: 22   (!) 33   Temp: 99.3 °F (37.4 °C)      SpO2: (!) 76% (!) 85% 94% 96%            Physical Exam  Vitals and nursing note reviewed. Constitutional:       Appearance: He is well-developed. HENT:      Head: Normocephalic and atraumatic. Mouth/Throat:      Mouth: Mucous membranes are moist.      Pharynx: Oropharynx is clear. Eyes:      Extraocular Movements: Extraocular movements intact. Pupils: Pupils are equal, round, and reactive to light. Cardiovascular:      Rate and Rhythm: Normal rate and regular rhythm. Pulses: Normal pulses. Heart sounds: Normal heart sounds. Pulmonary:      Effort: Pulmonary effort is normal.      Breath sounds: Normal breath sounds. Abdominal:      General: Bowel sounds are normal.      Palpations: Abdomen is soft. Musculoskeletal:         General: Normal range of motion. Cervical back: Normal range of motion and neck supple. Right lower leg: No edema. Left lower leg: No edema. Skin:     General: Skin is warm and dry. Capillary Refill: Capillary refill takes less than 2 seconds. Neurological:      General: No focal deficit present. Mental Status: He is alert and oriented to person, place, and time.    Psychiatric:         Mood and Affect: Mood normal.         Behavior: Behavior normal.      Recent Results (from the past 12 hour(s))   EKG, 12 LEAD, INITIAL    Collection Time: 10/19/22  6:33 PM   Result Value Ref Range    Ventricular Rate 99 BPM    Atrial Rate 99 BPM    P-R Interval 168 ms    QRS Duration 134 ms    Q-T Interval 370 ms    QTC Calculation (Bezet) 474 ms    Calculated P Axis 54 degrees    Calculated R Axis -56 degrees    Calculated T Axis 115 degrees    Diagnosis       Normal sinus rhythm  Possible Left atrial enlargement  Left axis deviation  Left ventricular hypertrophy with QRS widening ( Darin product )  Lateral infarct , age undetermined  Abnormal ECG  When compared with ECG of 16-MAR-2011 01:14,  premature ventricular complexes are no longer present  Questionable change in QRS duration  Criteria for Septal infarct are no longer present  Lateral infarct is now present     CBC WITH AUTOMATED DIFF    Collection Time: 10/19/22  6:41 PM   Result Value Ref Range    WBC 10.2 4.6 - 13.2 K/uL    RBC 4.28 (L) 4.35 - 5.65 M/uL    HGB 11.9 (L) 13.0 - 16.0 g/dL    HCT 37.5 36.0 - 48.0 %    MCV 87.6 78.0 - 100.0 FL    MCH 27.8 24.0 - 34.0 PG    MCHC 31.7 31.0 - 37.0 g/dL    RDW 14.2 11.6 - 14.5 %    PLATELET 272 696 - 327 K/uL    MPV 10.6 9.2 - 11.8 FL    NRBC 0.0 0  WBC    ABSOLUTE NRBC 0.00 0.00 - 0.01 K/uL    NEUTROPHILS 86 (H) 40 - 73 %    LYMPHOCYTES 7 (L) 21 - 52 %    MONOCYTES 6 3 - 10 %    EOSINOPHILS 0 0 - 5 %    BASOPHILS 0 0 - 2 %    IMMATURE GRANULOCYTES 0 0.0 - 0.5 %    ABS. NEUTROPHILS 8.8 (H) 1.8 - 8.0 K/UL    ABS. LYMPHOCYTES 0.8 (L) 0.9 - 3.6 K/UL    ABS. MONOCYTES 0.6 0.05 - 1.2 K/UL    ABS. EOSINOPHILS 0.0 0.0 - 0.4 K/UL    ABS. BASOPHILS 0.0 0.0 - 0.1 K/UL    ABS. IMM. GRANS. 0.0 0.00 - 0.04 K/UL    DF AUTOMATED     METABOLIC PANEL, COMPREHENSIVE    Collection Time: 10/19/22  6:41 PM   Result Value Ref Range    Sodium 143 136 - 145 mmol/L    Potassium 3.4 (L) 3.5 - 5.5 mmol/L    Chloride 110 100 - 111 mmol/L    CO2 26 21 - 32 mmol/L    Anion gap 7 3.0 - 18 mmol/L    Glucose 140 (H) 74 - 99 mg/dL    BUN 19 (H) 7.0 - 18 MG/DL    Creatinine 1.19 0.6 - 1.3 MG/DL    BUN/Creatinine ratio 16 12 - 20      eGFR >60 >60 ml/min/1.73m2    Calcium 9.0 8.5 - 10.1 MG/DL    Bilirubin, total 0.7 0.2 - 1.0 MG/DL    ALT (SGPT) 16 16 - 61 U/L    AST (SGOT) 63 (H) 10 - 38 U/L    Alk.  phosphatase 63 45 - 117 U/L    Protein, total 7.2 6.4 - 8.2 g/dL    Albumin 3.3 (L) 3.4 - 5.0 g/dL    Globulin 3.9 2.0 - 4.0 g/dL    A-G Ratio 0.8 0.8 - 1.7     TROPONIN-HIGH SENSITIVITY    Collection Time: 10/19/22  6:41 PM   Result Value Ref Range    Troponin-High Sensitivity 5,076 (HH) 0 - 78 ng/L   NT-PRO BNP    Collection Time: 10/19/22  6:41 PM   Result Value Ref Range    NT pro-BNP 6,190 (H) 0 - 900 PG/ML   MAGNESIUM    Collection Time: 10/19/22  6:41 PM   Result Value Ref Range    Magnesium 1.6 1.6 - 2.6 mg/dL   PROTHROMBIN TIME + INR    Collection Time: 10/19/22  6:41 PM   Result Value Ref Range    Prothrombin time 14.9 11.5 - 15.2 sec    INR 1.1 0.8 - 1.2     BLOOD GAS, ARTERIAL POC    Collection Time: 10/19/22  7:00 PM   Result Value Ref Range    Device: Non rebreather      FIO2 (POC) 95 %    pH (POC) 7.46 (H) 7.35 - 7.45      pCO2 (POC) 28.4 (L) 35.0 - 45.0 MMHG    pO2 (POC) 64 (L) 80 - 100 MMHG    HCO3 (POC) 20.3 (L) 22 - 26 MMOL/L    sO2 (POC) 93.7 92 - 97 %    Base deficit (POC) 2.5 mmol/L    Allens test (POC) Positive      Total resp. rate 45      Site RIGHT RADIAL      Specimen type (POC) ARTERIAL      Performed by Georgi Riedel      XR ABD PORT  1 V   Final Result      Slight interval increased gaseous distention of the bowel loop projecting over   the lower abdomen, inferior to the transverse colon, favor dilated sigmoid colon   although small bowel loops possible. Findings may reflect ileus or partial bowel   obstruction. Airspace opacities at the bilateral lung bases with suspected small bilateral   pleural effusions. XR CHEST PORT   Final Result    IMPRESSION:      1. Minimally worsened sequela of moderate congestion. Follow-up with plain   imaging of the chest.   2. Tubes and catheters as above. XR CHEST PORT   Final Result      No pneumothorax. No interval change with diffuse bilateral infiltrates. Recommend advancement of the esophagogastric tube by 8 cm to ensure that the   sidehole is within the gastric lumen      XR CHEST PORT   Final Result      Significant interval improvement in the infiltrates. Such rapid improvement   indicates the etiology was edema or hemorrhage.    Satisfactory tube placement XR CHEST PORT   Final Result      1. Bilateral multifocal hazy opacities appear worse compared to prior. Likely   infiltrate versus edema. 2. Lines, tubes, mechanical devices as described. Dictated by María Elena Wen MD, PGY-2      As attending radiologist, I have assessed the study images and dictated or   reviewed/edited the final report as needed      XR CHEST PORT   Final Result      1. Lines/tubes/devices as described. 2. Bilateral multifocal hazy opacities appear improved compared to prior exam.          Dictated by María Elena Wen MD, PGY-2      As attending radiologist, I have assessed the study images and dictated or   reviewed/edited the final report as needed      XR ABD (KUB)   Final Result      1. Left femoral central line as described. 2. Mild gaseous distention of the colon suggesting ileus, less likely   obstruction, correlate clinically. Dictated by María Elena Wen MD, PGY-2      As attending radiologist, I have assessed the study images and dictated or   reviewed/edited the final report as needed               CTA CHEST W OR W WO CONT   Final Result   1. No CT evidence of pulmonary embolus. 2.  Diffuse bilateral pulmonary alveolar opacities with dependent consolidation. Overall appearance is suggestive of diffuse alveolar dysfunction, including   hydrostatic pulmonary edema, alveolar hemorrhage, RDS. Bilateral pleural   effusions make pulmonary edema most likely. XR CHEST PORT   Final Result   1. Progression of bilateral pulmonary opacities with increased involvement   right upper lung. 2.  Interval intubation with tip 6 cm from the onofre. XR CHEST PA LAT   Final Result      Extensive airspace opacities throughout both lungs, most likely pulmonary edema. Small pleural effusions.       XR CHEST PORT    (Results Pending)   XR CHEST PORT    (Results Pending)   XR CHEST PORT    (Results Pending)       MDM  Number of Diagnoses or Management Options  Acute encephalopathy [G93.40 (ICD-10-CM)]  Acute on chronic congestive heart failure, unspecified heart failure type ([address]  Acute respiratory failure with hypoxia (HCC)  Cardiogenic shock (HCC) [R57.0 (ICD-10-CM)]  Encounter for palliative care [Z51.5 (ICD-10-CM)]  Goals of care, counseling/discussion [R16.75 (ICD-10-CM)]  NSTEMI (non-ST elevated myocardial infarction) [facility])  Diagnosis management comments: The patient is a 31-year-old male with past medical history significant for DVTs, and a recent NSTEMI where he was admitted to [facility] in cardiogenic shock requiring a balloon pump. He had been doing well since then but last evening became acutely short of breath. The patient's troponin and BNP are very elevated. My differential diagnosis includes an NSTEMI versus a PE. We are having difficulty with the patient laying flat in order to get his CTA done. I have ordered Lasix as well as heparin for this patient. I am turning the patient over to Dr. Awilda Brandon pending CT and admission.                Critical Care  Performed by: Laura Galaviz MD  Authorized by: Laura Galaviz MD     Critical care provider statement:     Critical care time (minutes):  35    Critical care time was exclusive of:  Separately billable procedures and treating other patients    Critical care was necessary to treat or prevent imminent or life-threatening deterioration of the following conditions:  Cardiac failure, circulatory failure and respiratory failure    Critical care was time spent personally by me on the following activities:  Development of treatment plan with patient or surrogate, discussions with consultants, discussions with primary provider, evaluation of patient's response to treatment, examination of patient, obtaining history from patient or surrogate, ordering and performing treatments and interventions, ordering and review of laboratory studies, ordering and review of radiographic studies, pulse oximetry, re-evaluation of patient's condition and review of old charts    I assumed direction of critical care for this patient from another provider in my specialty: no

## 2022-10-20 PROBLEM — J96.91 RESPIRATORY FAILURE WITH HYPOXIA (HCC): Status: ACTIVE | Noted: 2022-01-01

## 2022-10-20 NOTE — PROGRESS NOTES
Prepared to perform echocardiogram bedside, RN is currently putting in a central line. Will attempt later.

## 2022-10-20 NOTE — PROCEDURES
CVL Procedure Note With Ultrasound Guidance    Indication: Need for vasopressors    Risks, benefits, alternatives explained and consent obtained. Time out performed. Patient positioned in reverse Trendelenburg. Central line Bundle:  Full sterile barrier precautions used. 7-Step Sterility Protocol followed. (cap, mask sterile gown, sterile gloves, large sterile sheet, hand hygiene, 2% chlorhexidine for cutaneous antisepsis)  5 mL 1% Lidocaine placed at insertion site. Using ultrasound guidance,   Right IJ was cannulated however due to unfavorable anatomy procedure was aborted. Then Left femoral vein cannulated x 2 attempt(s) utilizing the modified Seldinger technique. Position of guidewire confirmed in vein using ultrasound prior to dilating. Dilation completed once successfully. Guidewire was removed. Central venous catheter was placed without difficulty. No immediate complications encountered. Good blood return in 2 ports but difficult flow in brown port. Catheter secured & Biopatch applied. Sterile Tegaderm placed. CXR and KUB confirmed placement. First assist:  Ruba AZUL was present.     Keli Arias DO  10/20/22  [facility] Pulmonary & Critical Care Medicine Fellow

## 2022-10-20 NOTE — PROGRESS NOTES
Shock alert called to [facility] to evaluate for transfer for initiation of ECMO, IABP, etc.  Spoke with Dr. Elis Cunningham (cardiologist) and Dr. Primitivo Nazario (CT surgery) who reviewed the patient's chart thoroughly and deemed the patient not a candidate for ECMO, IABP, and heart transplant. Not a candidate for any intervention at this time. They did not recommend any different treatment strategies at this time than what our team is already doing. They do not ffeel it is necessary to transfer him to [facility].

## 2022-10-20 NOTE — PROGRESS NOTES
Respiratory Therapy Ventilator Note:     10/20/22 0038   Patient Observations   Pulse (Heart Rate) (!) 106   Resp Rate 20   O2 Sat (%) 95 %   Airway - Endotracheal Tube 10/20/22 Oral   Placement Date/Time: 10/20/22 0004   Number of Attempts: 1  Inserted By:   Present on Admission/Arrival: No  Location: Oral  Placement Verified: Auscultation;EtCO2;Chest x-ray  Airway Types: Endotracheal, cuffed  Airway Tube Size: 7.5 mm  An. .. Insertion Depth (cm) 22 cm   Line Kyle Teeth   Side Secured Left   Site Assessment Clean, dry, & intact   Respiratory   Respiratory (WDL) X   Respiratory Pattern Regular   Chest/Tracheal Assessment Chest expansion, symmetrical   Breath Sounds Bilateral Upper;Rhonchi;Lower;Diminished   Airway Clearance   Suction ET Tube   Suction Device Inline suction catheter   Sputum Method Obtained Endotracheal   Sputum Amount Large   Sputum Color/Odor Clear   Sputum Consistency Thin   Ventilator Initiate/Discontinue   Ventilator Initiate Yes   Vent Settings   FIO2 (%) 100 %   SpO2/FIO2 Ratio 95   CMV Rate Set 20   Back-Up Rate 20   PC Set 23   PEEP/VENT (cm H2O) 15 cm H20   Insp Time (sec) 0.9 sec   Insp Rise Time % 50 %   Flow Trigger 3.0   Ventilator Measurements   Resp Rate Observed 20   Vt Exhaled (Machine Breath) (ml) 395 ml   Ve Observed (l/min) 7.95 l/min   PIP Observed (cm H2O) 39 cm H2O   MAP (cm H2O) 22   I:E Ratio Actual 1:2.3   Safety & Alarms   Circuit Temperature 98.4 °F (36.9 °C)   Backup Mode Checked/Apnea Yes   Pressure Max 45 cm H2O   Ve Min 2   Ve Max 20   Vt Min 200 ml   Vt Max 810 ml   RR Max 40   Ambu Bag Yes   Ambu Mask Yes   Age Specific Ventilator Associated Pneumonia Bundle   Patient Age Group Adult   Adult Ventilator Associated Pneumonia Bundle   Elevation of Head to 30-45 Degrees (Unless Contraindicated) Yes   Oxygen Therapy   Skin Assessment Clean, dry, & intact   Skin Protection for O2 Device Yes   Orientation Anterior;Bilateral   Location Cheek;Lip; Tongue Interventions Reposition Device   Airway Procedures   $$ Airway Procedures Intubation   Vent Method/Mode   Ventilation Method Conventional   Ventilator Mode Pressure control   $$ Ventilator Initial Initial Vent Day   Pulmonary Toilet   Pulmonary Toilet H. O.B elevated;Suction

## 2022-10-20 NOTE — PROGRESS NOTES
Patient had belongings including boots, pants, shirt, wallet with some debit cards and few dollars, and cell phone that were given to his sister, Jose Hicks.

## 2022-10-20 NOTE — PROGRESS NOTES
attended the interdisciplinary rounds for Horacio Staff, who is a 79 y.o.,male. Patients Primary Language is: [place]. According to the patients EMR Mormon Affiliation is: No Shinto. The reason the Patient came to the hospital is:   Patient Active Problem List    Diagnosis Date Noted    Respiratory failure with hypoxia ([facility] 10/20/2022    Elevated prostate specific antigen (PSA) 04/20/2015    Spermatocele of epididymis 04/20/2015    BPH with obstruction/lower urinary tract symptoms 04/20/2015      Plan:  Chaplains will continue to follow and will provide pastoral care on an as needed/requested basis.  recommends bedside caregivers page  on duty if patient shows signs of acute spiritual or emotional distress.     [address] [facility]  Board Certified [address] [place] [address]   [phone]

## 2022-10-20 NOTE — PROGRESS NOTES
Titrate RR 12, PC 12/previous ABG   10/20/22 1609   Patient Observations   Pulse (Heart Rate) 85   Resp Rate 13   O2 Sat (%) 100 %   Airway - Endotracheal Tube 10/20/22 Oral   Placement Date/Time: 10/20/22 0004   Number of Attempts: 1  Inserted By:   Present on Admission/Arrival: No  Location: Oral  Placement Verified: Auscultation;EtCO2;Chest x-ray  Airway Types: Endotracheal, cuffed  Airway Tube Size: 7.5 mm  An. .. Insertion Depth (cm) 24 cm   Line Kyle Lips   Side Secured Left   Cuff Pressure   (MLT)   Site Assessment Clean, dry, & intact   Respiratory   Respiratory (WDL) WDL   Patient on Vent Yes - If patient is on vent, add Doc Flowsheet Ventilator (). Respiratory Pattern Regular   Breath Sounds Bilateral Clear   Airway Clearance   Suction ET Tube   Suction Device Inline suction catheter   Sputum Method Obtained Endotracheal   Sputum Amount Moderate   Sputum Color/Odor Brown;Bloody   Sputum Consistency Thick; Thin   Vent Settings   FIO2 (%) 75 %   SpO2/FIO2 Ratio 133.33   CMV Rate Set 12   Back-Up Rate 12   PC Set 12   PEEP/VENT (cm H2O) 10 cm H20   Insp Time (sec) 0.9 sec   Insp Rise Time % 50 %   Flow Trigger 3   Ventilator Measurements   Resp Rate Observed 13   Vt Exhaled (Machine Breath) (ml) 645 ml   Ve Observed (l/min) 6.46 l/min   PIP Observed (cm H2O) 22 cm H2O   Plateau Pressure (cm H2O) 20 cm H2O   Driving Pressure 10 KCT0S   MAP (cm H2O) 13   I:E Ratio Actual 1:4.6   Static Compliance (ml/cm H20) 39 ml/cm H20   Safety & Alarms   Circuit Temperature 98.6 °F (37 °C)   Backup Mode Checked/Apnea Yes   Pressure Max 45 cm H2O   Ve Min 2   Ve Max 20   Vt Min 200 ml   RR Max 40   Ambu Bag Yes   Ambu Mask Yes   Vent Method/Mode   Ventilation Method Conventional   Ventilator Mode Assist control;Pressure control

## 2022-10-20 NOTE — PROCEDURES
Arterial  Line Procedure Note    Indication: invasive blood pressure monitoring    Emergent consent due to hemodynamic instability. Line Bundle:   Full sterile barrier precautions used. 7-Step Sterility Protocol followed. (cap, mask sterile gown, sterile gloves, large sterile sheet, hand hygiene, 2% chlorhexidine for cutaneous antisepsis)  5 mL 1% Lidocaine placed at insertion site. Left cannulated x 1 attempt(s) utilizing the modified Seldinger technique. Good blood return. Catheter secured & Biopatch applied. Sterile Tegaderm placed.         Guero Driver DO  [facility] Pulmonary & Critical Care Medicine Fellow  8:49 AM

## 2022-10-20 NOTE — ED NOTES
Patient care assumed from Dr. Leticia Martinez. This is a 59-year-old male presenting for shortness of breath since last night worse with exertion and relieved with rest.  Saturating 76% initially on arrival was placed on nonrebreather. However, patient continue to saturate only as high as 83% on high flow. Patient was intubated given hypoxia. There was copious secretions. Patient did have elevated troponin 5000 and BNP of 6000. Was awaiting CTA to rule out PE. However this is unlikely given that the patient is not hypotensive. He is saturating 94% following intubation. Given IV Lasix and started on heparin drip. Will discuss case with ICU for admission. Prior to intubation, patient stated that he did not want chest compressions done and is only partial code. 1251:  Patient's ABG shows no evidence of respiratory acidosis or metabolic acidosis. Case has been discussed with Dr. Kristian Peterson from the ICU. Will accept to the ICU at this time. We will also discuss case with cardiology. Critical Care Time:  The services I provided to this patient were to treat and/or prevent clinically significant deterioration that could result in the failure of one or more body systems and/or organ systems due to hypoxic respiratory failure, acute decompensated heart failure. Services included the following:  -reviewing nursing notes and old charts  -vital sign assessments  -direct patient care  -medication orders and management  -interpreting and reviewing diagnostic studies/labs  -re-evaluations  -documentation time    Aggregate critical care time was 45 minutes, which includes only time during which I was engaged in work directly related to the patient's care as described above, whether I was at bedside or elsewhere in the Emergency Department. It did not include time spent performing other reported procedures or the services of residents, students, nurses, or advance practice providers.        Flor Luciano DO    12:52 AM

## 2022-10-20 NOTE — CONSULTS
Cardiology Initial Patient Referral Note    Cardiology referral request from Dr. Rah Mack for evaluation and management/treatment of NSEMI, HFrEF    Date of  Admission: 10/19/2022  6:24 PM   Primary Care Physician:  Jason Shearer MD    Attending Cardiologist: Dr. Suzi Nuno   Patient seen and examined independently. Patient admitted in respiratory failure and is presently intubated on mechanical ventilation. Patient had a recent cardiac evaluation at [facility] as listed below. Patient presently requiring increasing dosages of vasopressors for blood pressure support. Informed that the patient's family is requesting the patient transfer to [facility]. Agree with support as noted below. Agree with assessment and plan as noted below. Linda Mckeon MD   Assessment:     Hospital Problems  Never Reviewed            Codes Class Noted POA    Respiratory failure with hypoxia [facility]) ICD-10-CM: J96.91  ICD-9-CM: 518.81  10/20/2022 Unknown          -Acute hypoxic respiratory failure, requiring intubation and mechanical ventilation.    -Cardiogenic shock, on pressor support. -A/c HFrEF/ICMY. CXR with pulmonary edema. LVEF 20% with moderate MR.   -NSTEMI, troponin 7619>2689.   -3V CAD. S/p LHC in setting of late presentation MI s/p IABP. Not a candidate for CABG/PCI per [facility] cardiology. Cath findings as noted below:   Left main coronary artery: Short, patent   Left anterior descending coronary artery: 50% ostial; 99% mid   BRAULIO 1 flow to mid/distal LAD with right to left collaterals 70% diagonal 1   Left circumflex coronary artery: Non-dominant, 100% proximal   Distal fed by right to left collaterals   Right coronary artery:   Dominant, diffuse 80% proximal to mid 70% at crux into posterior descending artery   Short posterolateral branch   Collaterals to left circumflex and left anterior descending    Cardiac MRI  Enlarged LV with severely decreased function. LVEF of 23.9%.  No viability in the proximal mid to distal LAD and and basal to apical circumflex territory. 4. Approximately 80% viability in the RCA territory. -PAF, on amiodarone and Eliquis as outpatient   -HTN, on Entresto, aldactone, lasix and lopressor as outpatient. -HLD  -DM  -H/o LLE DVT. -Tobacco abuse   -Partial code. Primary cardiologist is at [facility]. Plan:     -Echo pending.   -Continue Heparin gtt. Resume ASA and statin for CAD. -Trend cardiac bio markers.   -Continue Milrinone infusion. Wean pressor support as able. -Plan to start BB if pressures allow once of pressor support.   -Continue Lasix gtt with close monitoring of renal indices, strict I/Os and electrolytes. -Monitor and replace electrolytes as needed.   -Resume GDMT for CMY as able once off pressor support. -UDS pending.   -No plans for invasive coronary angiography at this time, previously thought to not be a candidate for CABG/PCI per [facility] Cardiology. Further recommendations per Dr. Juani Camarillo. We will continue support as noted below. Agree with assessment plan as noted below. Monique Morales MD     History of Present Illness: This is a 79 y.o. male admitted for Respiratory failure with hypoxia ([facility] [address] [J96.91]. Patient complains of: CARYL Collins is a 79 y.o. male, pmhx as stated above, who we are seeing for heart failure and elevated troponin. Patient is unable to provide any pertinent hx as he is currently intubated. He presented with c/o SOB x 1 day, per chart notes. In the ED, he was hypoxic in the 70s requiring intubation and mechanical ventilation. CXR initially with pulmonary edema. Initial troponin also noted to be in the 5000s and up trending, as well as elevated pro-BNP. In July 2022, he had a prolonged hospital course at [facility], S/p C in setting of late presentation MI s/p IABP. Not a candidate for CABG/PCI per [facility] cardiology.       Cardiac risk factors: smoking/ tobacco exposure, dyslipidemia, male gender, hypertension    Review of Symptoms:      Review of systems not obtained due to patient factors.      Past Medical History:     Past Medical History:   Diagnosis Date    Elevated PSA     Mental health disorder          Social History:     Social History     Socioeconomic History    Marital status: SINGLE   Tobacco Use    Smoking status: Every Day   Substance and Sexual Activity    Alcohol use: No    Drug use: No        Family History:     Family History   Family history unknown: Yes        Medications:   No Known Allergies     Current Facility-Administered Medications   Medication Dose Route Frequency    furosemide (LASIX) 100 mg in 0.9% sodium chloride (MBP/ADV) 110 mL infusion  10 mg/hr IntraVENous CONTINUOUS    sodium chloride (NS) flush 5-40 mL  5-40 mL IntraVENous Q8H    sodium chloride (NS) flush 5-40 mL  5-40 mL IntraVENous PRN    ondansetron (ZOFRAN ODT) tablet 4 mg  4 mg Oral Q8H PRN    Or    ondansetron (ZOFRAN) injection 4 mg  4 mg IntraVENous Q6H PRN    magnesium sulfate 2 g/50 ml IVPB (premix or compounded)  2 g IntraVENous ONCE    famotidine (PF) (PEPCID) 20 mg in 0.9% sodium chloride 10 mL injection  20 mg IntraVENous Q12H    midazolam (VERSED) injection 2 mg  2 mg IntraVENous Q10MIN PRN    fentaNYL citrate (PF) injection 100 mcg  100 mcg IntraVENous Q30MIN PRN    chlorhexidine (PERIDEX) 0.12 % mouthwash 10 mL  10 mL Oral Q12H    potassium chloride 10 mEq in 100 ml IVPB  10 mEq IntraVENous Q1H    fentaNYL (PF) 1,500 mcg/30 mL (50 mcg/mL) infusion  0-200 mcg/hr IntraVENous TITRATE    NOREPINephrine (LEVOPHED) 8 mg in 5% dextrose 250mL (32 mcg/mL) infusion  0.5-50 mcg/min IntraVENous TITRATE    midazolam in normal saline (VERSED) 1 mg/mL infusion  0-10 mg/hr IntraVENous TITRATE    heparin (porcine) 25,000 units in 0.45% saline 250 ml infusion  11.79-25 Units/kg/hr IntraVENous TITRATE    albumin human 5% (BUMINATE) solution 25 g  25 g IntraVENous ONCE    milrinone (PRIMACOR) 20 MG/100 ML D5W infusion  0-0.75 mcg/kg/min IntraVENous TITRATE         Physical Exam:   Visit Vitals  BP (!) 84/56   Pulse 84   Temp 99.2 °F (37.3 °C)   Resp (!) 33   Ht 5' 7\" (1.702 m)   Wt 81.7 kg (180 lb 1.9 oz)   SpO2 100%   BMI 28.21 kg/m²       TELE: normal sinus rhythm    BP Readings from Last 3 Encounters:   10/20/22 (!) 84/56   05/22/15 140/80   04/20/15 120/90     Pulse Readings from Last 3 Encounters:   10/20/22 84     Wt Readings from Last 3 Encounters:   10/20/22 81.7 kg (180 lb 1.9 oz)   11/16/17 95.3 kg (210 lb)   05/22/15 87.1 kg (192 lb)       General:  intubated on ventilator   Neck:  +JVD   Lungs:  bibasilar rales   Heart:  regular rate and rhythm, S1, S2 normal, no murmur, click, rub or gallop  Abdomen:  abdomen is soft without significant tenderness, masses, organomegaly or guarding  Extremities: trace-1+ B/L LE edema   Skin: Warm and dry.  no hyperpigmentation, vitiligo, or suspicious lesions  Neuro: opens eyes to verbal stimuli, non focal   Psych: unable to assess      Data Review:     Recent Labs     10/20/22  0515 10/19/22  1841   WBC 10.7 10.2   HGB 11.8* 11.9*   HCT 37.9 37.5    248     Recent Labs     10/20/22  0515 10/19/22  1841    143   K 3.0* 3.4*    110   CO2 24 26   * 140*   BUN 20* 19*   CREA 1.18 1.19   CA 8.7 9.0   MG 2.1 1.6   PHOS 3.7  --    ALB  --  3.3*   ALT  --  16   INR  --  1.1       Results for orders placed or performed during the hospital encounter of 10/19/22   EKG, 12 LEAD, INITIAL   Result Value Ref Range    Ventricular Rate 99 BPM    Atrial Rate 99 BPM    P-R Interval 168 ms    QRS Duration 134 ms    Q-T Interval 370 ms    QTC Calculation (Bezet) 474 ms    Calculated P Axis 54 degrees    Calculated R Axis -56 degrees    Calculated T Axis 115 degrees    Diagnosis       Normal sinus rhythm  Possible Left atrial enlargement  Left axis deviation  Left ventricular hypertrophy with QRS widening ( [place] product )  Lateral infarct , age undetermined  Abnormal ECG  When compared with ECG of 16-MAR-2011 01:14,  premature ventricular complexes are no longer present  Questionable change in QRS duration  Criteria for Septal infarct are no longer present  Lateral infarct is now present         All Cardiac Markers in the last 24 hours:  No results found for: CPK, CK, CKMMB, CKMB, RCK3, CKMBT, CKNDX, CKND1, LAURA, TROPT, TROIQ, LISA, TROPT, TNIPOC, BNP, BNPP    Last Lipid:  No results found for: CHOL, CHOLX, CHLST, CHOLV, HDL, HDLP, LDL, LDLC, DLDLP, TGLX, TRIGL, TRIGP, CHHD, CHHDX    Cardiographics:     EKG Results       Procedure 720 Value Units Date/Time    EKG, 12 LEAD, INITIAL [[id number]] Collected: 10/19/22 1833    Order Status: Completed Updated: 10/19/22 1836     Ventricular Rate 99 BPM      Atrial Rate 99 BPM      P-R Interval 168 ms      QRS Duration 134 ms      Q-T Interval 370 ms      QTC Calculation (Bezet) 474 ms      Calculated P Axis 54 degrees      Calculated R Axis -56 degrees      Calculated T Axis 115 degrees      Diagnosis --     Normal sinus rhythm  Possible Left atrial enlargement  Left axis deviation  Left ventricular hypertrophy with QRS widening ( [place] product )  Lateral infarct , age undetermined  Abnormal ECG  When compared with ECG of 16-MAR-2011 01:14,  premature ventricular complexes are no longer present  Questionable change in QRS duration  Criteria for Septal infarct are no longer present  Lateral infarct is now present                      XR Results (most recent):  Results from Hospital Encounter encounter on 10/19/22    XR CHEST PORT    Narrative  EXAM: Chest Radiograph    INDICATION:  intubation    TECHNIQUE: AP view of the chest    COMPARISON: 10/19/2022. FINDINGS:  Progression of bilateral pulmonary opacities with increased involvement of the  upper lungs. This demonstrates a relative radiating appearance with air  bronchograms. Increase trace fluid in the right minor fissure. No pneumothorax  identified. The cardiomediastinal silhouette is unchanged.   Endotracheal tube at the level of the probable 6 cm from the onofre. Impression  1. Progression of bilateral pulmonary opacities with increased involvement  right upper lung. 2.  Interval intubation with tip 6 cm from the onofre.         Signed By: Charity Harden PA-C     October 20, 2022

## 2022-10-20 NOTE — PROGRESS NOTES
Respiratory Therapy Note:    100% NRB trial attempted in order to transport to Ct Scan. Patient rapidly drop to 74% and was immediately placed back on HFNC. Physician  and Nurse are aware. Patient does not appear to be stable at this time to transport on NRB. Will continue to monitor closely.     Darrell JAMES RRT

## 2022-10-20 NOTE — PROGRESS NOTES
Titrate FIO2 60%/ABG results   10/20/22 1617   Patient Observations   Pulse (Heart Rate) 85   Resp Rate 17   O2 Sat (%) 100 %   Vent Settings   FIO2 (%) 60 %   SpO2/FIO2 Ratio 166.67   Ventilator Measurements   Resp Rate Observed 17

## 2022-10-20 NOTE — PROGRESS NOTES
Called by other RT to assist with patient. Patient not stable enough to transport to CT on current oxygen therapy. NRB attempted and patient decompensated quickly. MD notified and patient moved emergently to Room 3 for intubation. This RT and other RT at bedside to assist.     Placed on ventilator after intubated. SpO2 dropping into 70s while on vent. Patient taken off vent and bagged. Tube placement verified with x-ray. Vent settings adjusted and patient placed back on vent. SpO2 up in the 90s with new vent changes. Abg drawn and shown to MD.    Patient currently stable on ventilator.

## 2022-10-20 NOTE — PROGRESS NOTES
Tritrate PC 17, RR 12, Ve 6.5 to 7.0/ABG results   10/20/22 1122   Patient Observations   Pulse (Heart Rate) 86   Resp Rate 12   O2 Sat (%) 100 %   Airway - Endotracheal Tube 10/20/22 Oral   Placement Date/Time: 10/20/22 0004   Number of Attempts: 1  Inserted By:   Present on Admission/Arrival: No  Location: Oral  Placement Verified: Auscultation;EtCO2;Chest x-ray  Airway Types: Endotracheal, cuffed  Airway Tube Size: 7.5 mm  An. ..    Insertion Depth (cm) 24 cm   Line Kyle Lips   Side Secured Centered   Site Assessment Clean, dry, & intact   Vent Settings   FIO2 (%) 75 %   SpO2/FIO2 Ratio 133.33   CMV Rate Set 12   Back-Up Rate 12   PC Set 17   PEEP/VENT (cm H2O) 10 cm H20   Insp Time (sec) 0.9 sec   Insp Rise Time % 50 %   Flow Trigger 3   Ventilator Measurements   Resp Rate Observed 12   Vt Exhaled (Machine Breath) (ml) 568 ml   Ve Observed (l/min) 6.89 l/min   PIP Observed (cm H2O) 27 cm H2O   Plateau Pressure (cm H2O) 29 cm H2O   Driving Pressure 19 [id number]   MAP (cm H2O) 13   I:E Ratio Actual 1:4.6   Static Compliance (ml/cm H20) 40 ml/cm H20   Safety & Alarms   Circuit Temperature 98.6 °F (37 °C)

## 2022-10-20 NOTE — PROGRESS NOTES
Problem: Ventilator Management  Goal: *Adequate oxygenation and ventilation  Outcome: Progressing Towards Goal  Goal: *Patient maintains clear airway/free of aspiration  Outcome: Progressing Towards Goal  Goal: *Absence of infection signs and symptoms  Outcome: Progressing Towards Goal  Goal: *Normal spontaneous ventilation  Outcome: Progressing Towards Goal     Problem: Patient Education: Go to Patient Education Activity  Goal: Patient/Family Education  Outcome: Progressing Towards Goal     Problem: Pressure Injury - Risk of  Goal: *Prevention of pressure injury  Description: Document John Scale and appropriate interventions in the flowsheet.   Outcome: Progressing Towards Goal  Note: Pressure Injury Interventions:       Moisture Interventions: Absorbent underpads    Activity Interventions: Pressure redistribution bed/mattress(bed type)    Mobility Interventions: Pressure redistribution bed/mattress (bed type)    Nutrition Interventions: Discuss nutritional consult with provider    Friction and Shear Interventions: Minimize layers                Problem: Patient Education: Go to Patient Education Activity  Goal: Patient/Family Education  Outcome: Progressing Towards Goal     Problem: Non-Violent Restraints  Goal: Removal from restraints as soon as assessed to be safe  Outcome: Progressing Towards Goal  Goal: No harm/injury to patient while restraints in use  Outcome: Progressing Towards Goal  Goal: Patient's dignity will be maintained  Outcome: Progressing Towards Goal  Goal: Patient Interventions  Outcome: Progressing Towards Goal

## 2022-10-20 NOTE — ED NOTES
Intubation    Date/Time: 10/20/2022 12:28 AM  Performed by: Bárbara Potter DO  Authorized by: Bárbara Potter DO     Consent:     Consent obtained:  Emergent situation    Consent given by:  Patient and spouse    Risks, benefits, and alternatives were discussed: yes      Risks discussed:  Aspiration, brain injury, dental trauma and laryngeal injury    Alternatives discussed:  No treatment  Universal protocol:     Patient identity confirmed:  Verbally with patient  Pre-procedure details:     Indication: failure to oxygenate and failure to protect airway      Patient status:  Awake    Look externally: no concerns    Procedure details:     Preoxygenation:  Bag valve mask    CPR in progress: no      Intubation method:  Oral    Intubation technique: video assisted      Laryngoscope blade:  Hypercurved    Bougie used: no      Tube size (mm):  7.5    Tube type:  Cuffed    Number of attempts:  1    Ventilation between attempts: no      Tube visualized through cords: yes    Placement assessment:     ETT at teeth/gumline (cm):  21    Tube secured with:   Adhesive tape and ETT haines    Breath sounds:  Equal and absent over the epigastrium    Placement verification: chest rise, colorimetric ETCO2, CXR verification, direct visualization and waveform ETCO2      CXR findings:  Appropriate position  Post-procedure details:     Procedure completion:  Tolerated

## 2022-10-20 NOTE — PROGRESS NOTES
Comprehensive Nutrition Assessment    Type and Reason for Visit: Initial, Positive nutrition screen    Nutrition Recommendations/Plan:   Rec continue NPO status d/t increasing pressor requirements. Once/if pressor requirements decrease and only requiring 1-2 low-moderate dose, can consider tube feeds at trickle rate. Continue to monitor readiness of EN, weight, labs, and plan of care during admission. Malnutrition Assessment:  Malnutrition Status: At risk for malnutrition (specify) (NPO, vent) (10/21/22 1213)    Context:  Acute illness       Nutrition History and Allergies: PMHx: DVT, CHF, A fib, HTN. Wt hx: 210 lb (11/16/17), wt loss of 13.3% x 5 years per EMR hx. NKFA. Nutrition Assessment:    Admitted with SOB. While in the ED, pt became profoundly hypoxic was subsequently intubated 10/20 - likely 2/2 CHF exacerbation. +OGT 10/20. Transfer to [facility] for initiation of ECMO, IABP, etc was declined. Positive nutrition screen noted, MST: unsure. Discussed care during interdisciplinary rounds. Pt w/ worsening hemodynamics overnight, currently requiring 3 pressors. Nutrition Related Findings:    Abd distended per flowsheet. No recorded BM since PTA. Pertinent Meds: lipitor, pepcid, mg sulf, KCl, epi @ 4 mcg/min, fentanyl gtt, heparin gtt, levo @ 50 mcg/min   Pertinent Labs: POC Glucose 144-172 mg/dl x 24 hrs, Na 141 wnl, K 3.8 wnl (trending up), Phos 3.5 wnl Wound Type: None  vaso @ 0.03 units/min     Current Nutrition Intake & Therapies:  Average Meal Intake: NPO     DIET NPO    Anthropometric Measures:  Height: 5' 7\" (170.2 cm)  Ideal Body Weight (IBW): 148 lbs (67 kg)  Admission Body Weight: 179 lb 14.3 oz (81.6 kg)  Current Body Wt:  82.6 kg (182 lb 1.6 oz), 123 % IBW.     Current BMI (kg/m2): 28.5    Estimated Daily Nutrient Needs:  Energy Requirements Based On: Formula  Weight Used for Energy Requirements: Admission  Energy (kcal/day): 5530-5597 ([place] x 0.9-1.1)  Weight Used for Protein Requirements: Admission  Protein (g/day):  (1.2-2 g/day)  Method Used for Fluid Requirements: 1 ml/kcal  Fluid (ml/day): 5018-2590    Nutrition Diagnosis:   Swallowing difficulty related to impaired respiratory function as evidenced by NPO or clear liquid status due to medical condition, intubation  Inadequate protein-energy intake related to acute injury/trauma as evidenced by NPO or clear liquid status due to medical condition    Nutrition Interventions:   Food and/or Nutrient Delivery: Continue NPO     Coordination of Nutrition Care: Interdisciplinary rounds  Plan of Care discussed with: interdisciplinary team    Goals:     Goals: Meet at least 75% of estimated needs, by next RD assessment       Nutrition Monitoring and Evaluation:         Physical Signs/Symptoms Outcomes: Biochemical data, Hemodynamic status, Weight, GI status, Fluid status or edema    Discharge Planning:     Too soon to determine    Marielos London [address], [address]   Contact: [phone]

## 2022-10-20 NOTE — PROGRESS NOTES
Titrate RR 17/ABG results   10/20/22 1012   Patient Observations   Pulse (Heart Rate) 82   Resp Rate 18   O2 Sat (%) 100 %   Airway - Endotracheal Tube 10/20/22 Oral   Placement Date/Time: 10/20/22 0004   Number of Attempts: 1  Inserted By:   Present on Admission/Arrival: No  Location: Oral  Placement Verified: Auscultation;EtCO2;Chest x-ray  Airway Types: Endotracheal, cuffed  Airway Tube Size: 7.5 mm  An. ..    Insertion Depth (cm) 24 cm   Line Kyle Lips   Side Secured Centered   Cuff Pressure   (MLT)   Site Assessment Clean, dry, & intact   Respiratory   Respiratory (WDL) WDL   Respiratory Pattern Regular   Breath Sounds Bilateral Clear;Diminished   Vent Settings   FIO2 (%) 75 %   SpO2/FIO2 Ratio 133.33   CMV Rate Set 17   Back-Up Rate 17   PC Set 18   PEEP/VENT (cm H2O) 10 cm H20   Insp Time (sec) 0.9 sec   Insp Rise Time % 50 %   Flow Trigger 3   Ventilator Measurements   Resp Rate Observed 18   Vt Exhaled (Machine Breath) (ml) 646 ml   Ve Observed (l/min) 10.9 l/min   PIP Observed (cm H2O) 34 cm H2O   Plateau Pressure (cm H2O) 29 cm H2O   Driving Pressure 19 [id number]   MAP (cm H2O) 19   I:E Ratio Actual 1:2.9   Static Compliance (ml/cm H20) 34 ml/cm H20   Safety & Alarms   Circuit Temperature 98.6 °F (37 °C)   Backup Mode Checked/Apnea Yes   Pressure Max 45 cm H2O   Ve Min 2   Ve Max 20   Vt Min 200 ml   RR Max 40   Ambu Bag Yes   Ambu Mask Yes   Vent Method/Mode   Ventilation Method Conventional   Ventilator Mode Pressure control

## 2022-10-20 NOTE — ED NOTES
TRANSFER - OUT REPORT:    Verbal report given to Vannesa WOODY on Michael Mcpherson  being transferred to ICU Bed 314 for routine progression of care       Report consisted of patients Situation, Background, Assessment and   Recommendations(SBAR). Information from the following report(s) SBAR, ED Summary, MAR, Recent Results, and Cardiac Rhythm NSR  was reviewed with the receiving nurse. Lines:   Peripheral IV 10/19/22 Right Antecubital (Active)       Peripheral IV 10/20/22 Left;Posterior Hand (Active)       Peripheral IV 10/20/22 Posterior;Right Hand (Active)        Opportunity for questions and clarification was provided.       Patient transported with:   Monitor  Registered Nurse

## 2022-10-20 NOTE — INTERDISCIPLINARY ROUNDS
[facility]  Pulmonary, Critical Care, and Sleep Medicine  Interdisciplinary and Ventilator Weaning Rounds    Patient discussed in morning walking rounds and interdisciplinary rounds. ICU day: admitted 10/20    Overnight events:   Admitted overnight, worsening  hypotension- milrinone and levo added     Assessments and best practice:  Ventilator  Ventilator day 10/20  Vent settings: VC/VC+ with RR 20 and TV of 400 and FiO2 of 90 and PEEP of 10  VAP bundle, aim to keep peak plateau pressure 43-36VB H2O  Weaning assessed and documented   Patient does not meet criteria for SBT. Patient is not on sedation holiday. Plan to wean with PS na. Outcome: na   Final plan: na  Sedation  Fentanyl  and Versed  Other pertinent drips  Heparin, levophed, and lasix  Lines noted  Radial Arterial Line   Critical labs assessed  Yes  Antibiotics  none  Medications reviewed  Yes  Pending imaging  echo  Pending send out labs  No  Pending Procedures  CVL  Glycemic control  Stress ulcer prophylaxis. Pepcid  DVT prophylaxis. Heparin gtt  Need for Lines, real assessed. Yes  Restraint Reevaluation     Yes  I have reevaluated the patient one hour after initiation of intervention. The patient is comfortable, uninjured, but continues to pose an imminent risk of injury to self to themselves and/or serious disruption of medical treatment required to keep patient stable. The patient's current medical and behavioral conditions that warrant the use intervention include danger to self and Interference with medical equipment or treatment. Restraint or seclusion will be discontinued at the earliest possible time, regardless of the scheduled expiration of the order.  Based on my evaluation, restraints will be continued: YES       Family contact/MPOA:   [facility] Other Relative [address] [place] Other Relative [phone]     Chelsy Lackey Sister [phone]       Family updated: will call today     Palliative consult within 3 days of admission to ICU-  Ethics Guidance: 21 days      Daily Plans:  CVL placement, repeat ECHO   Q8 labs    DAVID time 15 minutes        Oscar Kevin PA-C  10/20/22  Pulmonary, Critical Care Medicine  [facility]

## 2022-10-20 NOTE — H&P
[facility]  Pulmonary, Critical Care, and Sleep Medicine    Name: Yahir Spann MRN: [id number]   : 1955 Hospital: [address] [place] [address] Dr   Date: 10/20/2022        Critical Care History and Physical      IMPRESSION:   Acute hypoxic respiratory failure - requiring mechanical ventilation likely 2/2 CHF exacerbation   Acute on chronic decompensated systolic heart failure - Echo  EF 20%  NSTEMI - initial troponin 5K, on heparin gtt  Hx of LLE DVT   Hx of A fib  Hx of colonic pseudoobstruction   Hx of CAD  s/p cardiac cath  with LAD, circumflex and R coronary artery occlusion, s/p intra-aortic balloon pump assist  at [facility]  Hx of CHF - EF 20%   Hx of BPH  Unspecified psych disorder - previously on Risperidone, Paxil, Geodon     Patient Active Problem List   Diagnosis Code    Elevated prostate specific antigen (PSA) R97.20    Spermatocele of epididymis N43.40    BPH with obstruction/lower urinary tract symptoms N40.1, N13.8    Respiratory failure with hypoxia (HCC) J96.91        RECOMMENDATIONS:   Neuro: Titrate sedation to RASS of 0 to -1. PRN for breakthrough sedation needs. Pulm: Titrate FiO2 for goal SPO2> 90%,VAP prevention bundle, head of the bed at 30' all times. Daily sedation holiday and assessment for weaning with SBT as tolerated. PRN duonebs. Bronchodilators, steroids, pulmonary hygiene care, Aspiration precautions, Keep HOB >30 degrees  CVS :Actively titrate vasopressors aim MAP >65mmHg, Check cardiac panel, ECHO results. Lasix gtt  GI: SUP, Trend LFTs, Zofran PRN for N/V, NPO  Renal:  Trend Renal indices, Strict Is/Os, Petty (+)  Hem/Onc: Monitor for s/o active bleeding. I/D:Sepsis bundle per hospital protocol, Blood, Sputum, and Urine cultures drawn and will be followed. Lactic acid ordered- initial and repeat Q4hrs till normalized. Antibiotics: monitor off abx for now. Trend WBCs and temperature curve. Endocrine: Q6 glucoses, SSI.  Check TSH level  Metabolic:  Daily BMP, mag, phos. Trend lytes, replace as needed. Musc/Skin: no acute issues, wound care  Partial Code       Best practice : APPLICABLE TO PATIENT    Glycemic control  IHI ICU bundles: Real Bundle Followed and Vent Bundle Followed, Vent Day 1     [facility] Vent patients-    VAP bundle-Sivan tube to suction at 20-30 cm Hg, Maintain [place] tube with 5-10ml air every 4 hours, Routine oral care every 4 hours, Elevation of head > 45 degree, Daily sedation holiday and SBT evaluation starting at 6.00am.  Sress ulcer prophylaxis. Pepcid  DVT prophylaxis. Heparin gtt  Need for Lines, real assessed. Palliative care evaluation. Restraints not needed. Subjective/History: This patient has been seen and evaluated at the request of Dr. Candis Otero for acute hypoxic respiratory failure. 10/20/22    Patient is a 79 y.o. male with a past medical history of DVT, CHF, A fib, HTN, presented to the ED with complaints of shortness of breath x1 day. Of note, pt was admitted to [facility] in 7/22 for cardiogenic shock and severe CAD. Upon arrival to the ED, pt's O2 sats were in the 70s and was subsequently placed on a NRB. Further workup revealed elevated proBNP, elevated troponin, hypokalemia, significant pulmonary edema noted on CXR. While in the ED, pt became profoundly hypoxic was subsequently intubated. Pt's paO2 was 199 on PEEP 15 and FiO2 100%. Upon my initial evaluation, pt is vented, hemodynamically stable, withdraws to painful stimuli. Lasix 40mg and gtt ordered. The patient is critically ill and can not provide additional history due to Ventilated. Past Medical History:   Diagnosis Date    Elevated PSA     Mental health disorder         Past Surgical History:   Procedure Laterality Date    HX COLONOSCOPY          Prior to Admission medications    Medication Sig Start Date End Date Taking? Authorizing Provider   citalopram (CELEXA) 20 mg tablet Take  by mouth daily.     Provider, Historical   ziprasidone hcl (GEODON) 60 mg capsule Take 60 mg by mouth two (2) times daily (with meals). Provider, Historical   levofloxacin (LEVAQUIN) 750 mg tablet Take one tablet 1 hour prior to prostate biopsy. 5/22/15   JORGE ALBERTO Alfaro   tamsulosin (FLOMAX) 0.4 mg capsule Take 0.4 mg by mouth daily. Provider, Historical       Current Facility-Administered Medications   Medication Dose Route Frequency    propofol (DIPRIVAN) 10 mg/mL infusion  0-50 mcg/kg/min IntraVENous TITRATE    furosemide (LASIX) 100 mg in 0.9% sodium chloride (MBP/ADV) 110 mL infusion  20 mg/hr IntraVENous CONTINUOUS    sodium chloride (NS) flush 5-40 mL  5-40 mL IntraVENous Q8H    magnesium sulfate 4 g/100 mL IVPB  4 g IntraVENous ONCE    magnesium sulfate 2 g/50 ml IVPB (premix or compounded)  2 g IntraVENous ONCE    famotidine (PF) (PEPCID) 20 mg in 0.9% sodium chloride 10 mL injection  20 mg IntraVENous Q12H    potassium bicarb-citric acid (EFFER-K) tablet 40 mEq  40 mEq Oral NOW    chlorhexidine (PERIDEX) 0.12 % mouthwash 10 mL  10 mL Oral Q12H    iopamidoL (ISOVUE-370) 76 % injection  mL   mL IntraVENous RAD ONCE    heparin (porcine) 25,000 units in 0.45% saline 250 ml infusion  11.79-25 Units/kg/hr IntraVENous TITRATE       No Known Allergies     Social History     Tobacco Use    Smoking status: Every Day    Smokeless tobacco: Not on file   Substance Use Topics    Alcohol use: No        Family History   Family history unknown: Yes          Review of Systems:  Review of systems not obtained due to patient factors.     Objective:   Vital Signs:    Visit Vitals  BP (!) 131/96   Pulse (!) 102   Temp 99.3 °F (37.4 °C)   Resp 14   Ht 5' 7\" (1.702 m)   Wt 84.8 kg (187 lb)   SpO2 94%   BMI 29.29 kg/m²       O2 Device: Hi flow nasal cannula   O2 Flow Rate (L/min): 40 l/min   Temp (24hrs), Av.3 °F (37.4 °C), Min:99.3 °F (37.4 °C), Max:99.3 °F (37.4 °C)       Intake/Output:   Last shift:      No intake/output data recorded. Last 3 shifts: No intake/output data recorded. No intake or output data in the 24 hours ending 10/20/22 5623        Physical Exam:     General:  Intubated/sedated   Head:  Normocephalic, without obvious abnormality, atraumatic. Eyes:  Conjunctivae/corneas clear. PERRL,   Nose: Nares normal. Septum midline. Mucosa normal. No drainage or sinus tenderness. Throat: Lips, mucosa, and tongue normal. Teeth and gums normal.   Neck: Supple, symmetrical, trachea midline, no adenopathy, thyroid: no enlargment/tenderness/nodules, no carotid bruit and no JVD. Back:   Symmetric, no curvature. ROM normal.   Lungs:   (+)diffuse rales bilaterally   Chest wall:  No tenderness or deformity. Heart:  Regular rate and rhythm, S1, S2 normal, no murmur, click, rub or gallop. Abdomen:   Soft, non-tender. Bowel sounds normal. No masses,  No organomegaly. Extremities: Extremities normal, atraumatic, no cyanosis or edema. - restraints    Pulses: 2+ and symmetric all extremities.    Skin: Skin color, texture, turgor normal. No rashes or lesions   Lymph nodes:  Cervical, supraclavicular, and axillary nodes normal.   Neurologic: Grossly nonfocal     Devices:  ETT: (+)  OGT: (+)  Lines: PIVs  Drains: (-)  Petty: (+)    Data:     Recent Results (from the past 24 hour(s))   EKG, 12 LEAD, INITIAL    Collection Time: 10/19/22  6:33 PM   Result Value Ref Range    Ventricular Rate 99 BPM    Atrial Rate 99 BPM    P-R Interval 168 ms    QRS Duration 134 ms    Q-T Interval 370 ms    QTC Calculation (Bezet) 474 ms    Calculated P Axis 54 degrees    Calculated R Axis -56 degrees    Calculated T Axis 115 degrees    Diagnosis       Normal sinus rhythm  Possible Left atrial enlargement  Left axis deviation  Left ventricular hypertrophy with QRS widening ( Darin product )  Lateral infarct , age undetermined  Abnormal ECG  When compared with ECG of 16-MAR-2011 01:14,  premature ventricular complexes are no longer present  Questionable change in QRS duration  Criteria for Septal infarct are no longer present  Lateral infarct is now present     CBC WITH AUTOMATED DIFF    Collection Time: 10/19/22  6:41 PM   Result Value Ref Range    WBC 10.2 4.6 - 13.2 K/uL    RBC 4.28 (L) 4.35 - 5.65 M/uL    HGB 11.9 (L) 13.0 - 16.0 g/dL    HCT 37.5 36.0 - 48.0 %    MCV 87.6 78.0 - 100.0 FL    MCH 27.8 24.0 - 34.0 PG    MCHC 31.7 31.0 - 37.0 g/dL    RDW 14.2 11.6 - 14.5 %    PLATELET 576 945 - 489 K/uL    MPV 10.6 9.2 - 11.8 FL    NRBC 0.0 0  WBC    ABSOLUTE NRBC 0.00 0.00 - 0.01 K/uL    NEUTROPHILS 86 (H) 40 - 73 %    LYMPHOCYTES 7 (L) 21 - 52 %    MONOCYTES 6 3 - 10 %    EOSINOPHILS 0 0 - 5 %    BASOPHILS 0 0 - 2 %    IMMATURE GRANULOCYTES 0 0.0 - 0.5 %    ABS. NEUTROPHILS 8.8 (H) 1.8 - 8.0 K/UL    ABS. LYMPHOCYTES 0.8 (L) 0.9 - 3.6 K/UL    ABS. MONOCYTES 0.6 0.05 - 1.2 K/UL    ABS. EOSINOPHILS 0.0 0.0 - 0.4 K/UL    ABS. BASOPHILS 0.0 0.0 - 0.1 K/UL    ABS. IMM. GRANS. 0.0 0.00 - 0.04 K/UL    DF AUTOMATED     METABOLIC PANEL, COMPREHENSIVE    Collection Time: 10/19/22  6:41 PM   Result Value Ref Range    Sodium 143 136 - 145 mmol/L    Potassium 3.4 (L) 3.5 - 5.5 mmol/L    Chloride 110 100 - 111 mmol/L    CO2 26 21 - 32 mmol/L    Anion gap 7 3.0 - 18 mmol/L    Glucose 140 (H) 74 - 99 mg/dL    BUN 19 (H) 7.0 - 18 MG/DL    Creatinine 1.19 0.6 - 1.3 MG/DL    BUN/Creatinine ratio 16 12 - 20      eGFR >60 >60 ml/min/1.73m2    Calcium 9.0 8.5 - 10.1 MG/DL    Bilirubin, total 0.7 0.2 - 1.0 MG/DL    ALT (SGPT) 16 16 - 61 U/L    AST (SGOT) 63 (H) 10 - 38 U/L    Alk.  phosphatase 63 45 - 117 U/L    Protein, total 7.2 6.4 - 8.2 g/dL    Albumin 3.3 (L) 3.4 - 5.0 g/dL    Globulin 3.9 2.0 - 4.0 g/dL    A-G Ratio 0.8 0.8 - 1.7     TROPONIN-HIGH SENSITIVITY    Collection Time: 10/19/22  6:41 PM   Result Value Ref Range    Troponin-High Sensitivity 5,076 (HH) 0 - 78 ng/L   NT-PRO BNP    Collection Time: 10/19/22  6:41 PM   Result Value Ref Range    NT pro-BNP 6,190 (H) 0 - 900 PG/ML   MAGNESIUM    Collection Time: 10/19/22  6:41 PM   Result Value Ref Range    Magnesium 1.6 1.6 - 2.6 mg/dL   PROTHROMBIN TIME + INR    Collection Time: 10/19/22  6:41 PM   Result Value Ref Range    Prothrombin time 14.9 11.5 - 15.2 sec    INR 1.1 0.8 - 1.2     BLOOD GAS, ARTERIAL POC    Collection Time: 10/19/22  7:00 PM   Result Value Ref Range    Device: Non rebreather      FIO2 (POC) 95 %    pH (POC) 7.46 (H) 7.35 - 7.45      pCO2 (POC) 28.4 (L) 35.0 - 45.0 MMHG    pO2 (POC) 64 (L) 80 - 100 MMHG    HCO3 (POC) 20.3 (L) 22 - 26 MMOL/L    sO2 (POC) 93.7 92 - 97 %    Base deficit (POC) 2.5 mmol/L    Allens test (POC) Positive      Total resp.  rate 45      Site RIGHT RADIAL      Specimen type (POC) ARTERIAL      Performed by [id number] PANEL W/COVID-19, PCR    Collection Time: 10/20/22 12:20 AM    Specimen: Nasopharyngeal   Result Value Ref Range    Adenovirus Not detected NOTD      Coronavirus 229E Not detected NOTD      Coronavirus HKU1 Not detected NOTD      Coronavirus CVNL63 Not detected NOTD      Coronavirus OC43 Not detected NOTD      SARS-CoV-2, PCR Not detected NOTD      Metapneumovirus Not detected NOTD      Rhinovirus and Enterovirus Not detected NOTD      Influenza A Not detected NOTD      Influenza B Not detected NOTD      Parainfluenza 1 Not detected NOTD      Parainfluenza 2 Not detected NOTD      Parainfluenza 3 Not detected NOTD      Parainfluenza virus 4 Not detected NOTD      RSV by PCR Not detected NOTD      B. parapertussis, PCR Not detected NOTD      Bordetella pertussis - PCR Not detected NOTD      Chlamydophila pneumoniae DNA, QL, PCR Not detected NOTD      Mycoplasma pneumoniae DNA, QL, PCR Not detected NOTD     BLOOD GAS,LACTIC ACID, POC    Collection Time: 10/20/22 12:34 AM   Result Value Ref Range    Device: ADULT VENT      FIO2 (POC) 100 %    pH (POC) 7.35 7.35 - 7.45      pCO2 (POC) 40.5 35.0 - 45.0 MMHG    pO2 (POC) 199 (H) 80 - 100 MMHG    HCO3 (POC) 22.3 22 - 26 MMOL/L    sO2 (POC) 99.7 (H) 92 - 97 %    Base deficit (POC) 3.1 mmol/L    Mode PRESSURE CONTROL      Tidal volume 417 ml    Set Rate 20 bpm    PEEP/CPAP (POC) 15 cmH2O    Allens test (POC) Positive      Total resp. rate 20      Site LEFT RADIAL      Specimen type (POC) ARTERIAL      Performed by [facility]     Lactic Acid (POC) 1.83 0.40 - 2.00 mmol/L           Recent Labs     10/20/22  0034 10/19/22  1900   FIO2I 100 95   HCO3I 22.3 20.3*   PCO2I 40.5 28.4*   PHI 7.35 7.46*   PO2I 199* 64*       Telemetry:normal sinus rhythm    Imaging:  I have personally reviewed the patients radiographs and have reviewed the reports:    XR Results (most recent):  Results from Hospital Encounter encounter on 10/19/22    XR CHEST PORT    Narrative  EXAM: Chest Radiograph    INDICATION:  intubation    TECHNIQUE: AP view of the chest    COMPARISON: 10/19/2022. FINDINGS:  Progression of bilateral pulmonary opacities with increased involvement of the  upper lungs. This demonstrates a relative radiating appearance with air  bronchograms. Increase trace fluid in the right minor fissure. No pneumothorax  identified. The cardiomediastinal silhouette is unchanged. Endotracheal tube at the level of the probable 6 cm from the onofre. Impression  1. Progression of bilateral pulmonary opacities with increased involvement  right upper lung. 2.  Interval intubation with tip 6 cm from the onofre. CT Results (most recent):  Results from Hospital Encounter encounter on 03/16/11    CT ABDMEN/PELVIS    Narrative  Ordering MD: Howie Lr MD  Interpreted By: Necia Cheadle MD  ** FINAL **  ---------------------------------------------------------------------  Procedure Date:  Mar 16 2011  4:19AM    Accession Number:  [id number]  Order No: [id number]  Procedure: CTD - ABDOMEN/PELVIS  CPT Code: 77550  Reason: ABDOMINAL PAIN  INTERPRETATION:  CLINICAL HISTORY:   Abdominal pain.   COMPARISON EXAMINATIONS: None.  TECHNIQUE:   CT of the abdomen and pelvis following oral and  nonionic intravenous contrast administration. ---  CT ABDOMEN  ---  LUNG BASES:   Mild bilateral lower lobe cylindrical bronchiectasis. SOLID ABDOMINAL VISCERA:   Numerous gallstones within the  gallbladder, without gallbladder distention or wall thickening. Few  bilateral renal cysts, largest at the right interpolar region (4.4  cm). Otherwise, unremarkable. RETROPERITONEUM:   Very mild atherosclerotic aortic calcification. No significantly enlarged retroperitoneal lymph nodes. OTHER:   No ascites or free intraperitoneal air.  ---  CT PELVIS  ---  RETROPERITONEUM:   No enlarged lymph nodes or retroperitoneal mass. BOWEL:   No bowel dilatation or bowel mass. Short section of normal  appearing appendix visualized. OTHER:   No pelvic fluid. No pelvic mass. OSSEOUS STRUCTURES:   Mild to moderate hip arthropathy. Mild lumbar  spondylosis. Focal sclerosis in the anteroinferior aspect of the  T10 vertebral body (9 x 11 cm),  likely degenerative.  --------------  IMPRESSION:  --------------  1. Cholelithiasis, without evidence of acute cholecystitis. No  acute inflammatory process identified. Overnight interpretation provided by on-call resident. Total of  30   min critical care time spent at bedside during the course of care providing evaluation,management and care decisions and ordering appropriate treatment related to critical care problems exclusive of procedures. The reason for providing this level of medical care for this critically ill patient was due a critical illness that impaired one or more vital organ systems such that there was a high probability of imminent or life threatening deterioration in the patients condition.  This care involved high complexity decision making to assess, manipulate, and support vital system functions, to treat this degree vital organ system failure and to prevent further life threatening deterioration of the patients condition.         Breanna Mcallister PA-C  10/20/22  Pulmonary, Critical Care Medicine  [facility]

## 2022-10-20 NOTE — PROGRESS NOTES
PCCM Update:    Pt accepted by our service around 01:00. Pt is critically ill on very high vent settings upon my initial evaluation (PEEP 15 and FiO2 100%). He needs aggressive diuresis with a lasix gtt however pt is hypotensive and inadequately sedated. Pt needs close monitoring that cannot be provided in current level of care. Discussed with nursing supervisor who is declining to bring patient up to the ICU citing staffing issues. Case discussed with Dr. Marisel Waterman.      Guanako Garcia PA-C  10/20/22  Pulmonary, Critical Care Medicine  [facility]

## 2022-10-20 NOTE — ED NOTES
Report received from [facility]. Assumed care of patient. Pt currently intubated and on ventilator, Propofol infusing per MAR, IV Heparin infusing per MAR. Per previous nurse, no baseline PTT obtained prior to initiation of Heparin, MD aware, Heparin was started at lowest rate, will follow up on PTT per MD orders and Protocol. Pt on cardiac monitor, will continue to monitor.      Next PTT due at [id number]

## 2022-10-21 NOTE — DIABETES MGMT
Glycemic Control:  Pt with no history of Diabetes. Pt's blood glucose readings are above the normal range. Recommend A1c and corrective lispro. Recent Glucose Results:   Lab Results   Component Value Date/Time     (H) 10/21/2022 04:00 AM     (H) 10/20/2022 07:50 PM     (H) 10/20/2022 12:15 PM    GLUCPOC 144 (H) 10/21/2022 06:11 AM    GLUCPOC 172 (H) 10/21/2022 12:06 AM    GLUCPOC 186 (H) 10/20/2022 06:04 PM     No results found for: HBA1C, TMB4OQHU, [id number]  Continue to monitor for glycemic control.   Diamond Marshall MS RD CDE

## 2022-10-21 NOTE — PROGRESS NOTES
attended the interdisciplinary rounds for Racheal Linares, who is a 79 y.o.,male. Patients Primary Language is: [place]. According to the patients EMR Hinduism Affiliation is: No Zoroastrian. The reason the Patient came to the hospital is:   Patient Active Problem List    Diagnosis Date Noted    Respiratory failure with hypoxia ([facility] [address] 10/20/2022    Elevated prostate specific antigen (PSA) 04/20/2015    Spermatocele of epididymis 04/20/2015    BPH with obstruction/lower urinary tract symptoms 04/20/2015      Plan:  Chaplains will continue to follow and will provide pastoral care on an as needed/requested basis.  recommends bedside caregivers page  on duty if patient shows signs of acute spiritual or emotional distress.     [address]  Board Certified [address] [facility]   [phone]

## 2022-10-21 NOTE — PROGRESS NOTES
Cardiology Progress Note    Admit Date: 10/19/2022  Attending Cardiologist: Dr. Delong [place]:      -Acute hypoxic respiratory failure, requiring intubation and mechanical ventilation.    -Cardiogenic shock, on pressor support. -A/c HFrEF/ICMY. CXR with pulmonary edema. LVEF 20% with moderate MR.   -NSTEMI, troponin peaked at 8773.   -3V CAD. S/p LHC in setting of late presentation MI s/p IABP. Not a candidate for CABG/PCI per [place] [facility]. Cath findings as noted below:   Left main coronary artery: Short, patent   Left anterior descending coronary artery: 50% ostial; 99% mid   BRAULIO 1 flow to mid/distal LAD with right to left collaterals 70% diagonal 1   Left circumflex coronary artery: Non-dominant, 100% proximal   Distal fed by right to left collaterals   Right coronary artery:   Dominant, diffuse 80% proximal to mid 70% at crux into posterior descending artery   Short posterolateral branch   Collaterals to left circumflex and left anterior descending    Cardiac MRI  Enlarged LV with severely decreased function. LVEF of 23.9%. No viability in the proximal mid to distal LAD and and basal to apical circumflex territory. 4. Approximately 80% viability in the RCA territory. -PAF, on amiodarone and Eliquis as outpatient   -HTN, on Entresto, aldactone, lasix and lopressor as outpatient. -HLD  -DM  -H/o LLE DVT. -Tobacco abuse   -Partial code. Primary cardiologist is at [place]. Plan:     Final Echo report pending. Unable to tolerate Milrinone gtt yesterday d/t hypotension and increasing pressor support requirement. Would continue Heparin gtt, ASA and statin. No BB while on pressor support. Will resume IV diuretics, continue diuresing as renal indices will allow; monitor strict I/Os and electrolytes.    Continue to address goals of care.     ----------------------------------  Patient has severe multivessel coronary artery disease and not a candidate for long-term VAT, transplant, PCI or bypass. This has been discussed at [facility]. Continue supportive care for now but long-term prognosis remains poor. Partial CODE STATUS noted. Discussed with ICU fellow at bedside. I saw, examined, and evaluated this patient and performed the substantive portion of the encounter for > 50% of the time including extensive history, physical exam, and medical decision making as discussed with patient and next-of-kin as needed. I personally reviewed the patient's labs, tests, vitals, orders, medications, updated history, and other providers assessments as well. I personally agree with the findings as stated and the plan as documented. Rosalie Mosqueda MD      Subjective:      Intubated, sedated     Objective:      Patient Vitals for the past 8 hrs:   Temp Pulse Resp BP SpO2   10/21/22 1144 -- 96 14 -- 99 %   10/21/22 0828 -- (!) 108 22 -- 100 %   10/21/22 0700 100 °F (37.8 °C) 89 13 110/72 100 %   10/21/22 0600 99.9 °F (37.7 °C) 87 11 108/70 100 %   10/21/22 0500 99.9 °F (37.7 °C) 91 16 110/69 99 %         Patient Vitals for the past 96 hrs:   Weight   10/21/22 0400 82.6 kg (182 lb 1.6 oz)   10/20/22 1414 81.6 kg (180 lb)   10/20/22 0936 81.6 kg (180 lb)   10/20/22 0552 81.7 kg (180 lb 1.9 oz)   10/19/22 2128 84.8 kg (187 lb)       TELE: SR               Current Facility-Administered Medications   Medication Dose Route Frequency Last Admin    [Held by provider] furosemide (LASIX) 100 mg in 0.9% sodium chloride (MBP/ADV) 110 mL infusion  10 mg/hr IntraVENous CONTINUOUS Held at 10/20/22 1946    sodium chloride (NS) flush 5-40 mL  5-40 mL IntraVENous Q8H 10 mL at 10/21/22 0524    sodium chloride (NS) flush 5-40 mL  5-40 mL IntraVENous PRN      ondansetron (ZOFRAN ODT) tablet 4 mg  4 mg Oral Q8H PRN      Or    ondansetron (ZOFRAN) injection 4 mg  4 mg IntraVENous Q6H PRN      famotidine (PF) (PEPCID) 20 mg in 0.9% sodium chloride 10 mL injection  20 mg IntraVENous Q12H 20 mg at 10/21/22 1046 midazolam (VERSED) injection 2 mg  2 mg IntraVENous Q10MIN PRN      fentaNYL citrate (PF) injection 100 mcg  100 mcg IntraVENous Q30MIN  mcg at 10/20/22 0310    chlorhexidine (PERIDEX) 0.12 % mouthwash 10 mL  10 mL Oral Q12H 10 mL at 10/21/22 1041    fentaNYL (PF) 1,500 mcg/30 mL (50 mcg/mL) infusion  0-200 mcg/hr IntraVENous TITRATE 200 mcg/hr at 10/21/22 0807    midazolam in normal saline (VERSED) 1 mg/mL infusion  0-10 mg/hr IntraVENous TITRATE 2 mg/hr at 10/20/22 1922    heparin (porcine) 25,000 units in 0.45% saline 250 ml infusion  11.79-25 Units/kg/hr IntraVENous TITRATE 19.79 Units/kg/hr at 10/21/22 0533    [Held by provider] milrinone (PRIMACOR) 20 MG/100 ML D5W infusion  0.375 mcg/kg/min IntraVENous CONTINUOUS Held at 10/20/22 0931    aspirin chewable tablet 81 mg  81 mg Per NG tube DAILY 81 mg at 10/21/22 1041    atorvastatin (LIPITOR) tablet 40 mg  40 mg Per NG tube QHS      EPINEPHrine (ADRENALIN) 4 mg in 0.9% sodium chloride 250 mL infusion  1-10 mcg/min IntraVENous TITRATE 5 mcg/min at 10/21/22 0600    vasopressin (VASOSTRICT) 20 Units in 0.9% sodium chloride 100 mL infusion  0.03 Units/min IntraVENous CONTINUOUS 0.03 Units/min at 10/21/22 1042    NOREPINephrine (LEVOPHED) 32,000 mcg in dextrose 5% 250 mL (128 mcg/mL) infusion  0.5-50 mcg/min IntraVENous TITRATE 50 mcg/min at 10/21/22 0308    acetaminophen (TYLENOL) suppository 650 mg  650 mg Rectal Q4H  mg at 10/20/22 2328         Intake/Output Summary (Last 24 hours) at 10/21/2022 1212  Last data filed at 10/21/2022 2491  Gross per 24 hour   Intake 1310.14 ml   Output 785 ml   Net 525.14 ml       Physical Exam:  General:  intubated, sedated   Neck:  +JVD  Lungs:  bibasilar rales   Heart:  RRR, +systolic murmur left mid axillary   Abdomen:  abdomen is soft without significant tenderness, masses, organomegaly or guarding  Extremities:  trace B/L LE edema     Visit Vitals  /72 (BP 1 Location: Right upper arm, BP Patient Position:  At rest)   Pulse 96   Temp 100 °F (37.8 °C)   Resp 14   Ht 5' 7\" (1.702 m)   Wt 82.6 kg (182 lb 1.6 oz)   SpO2 99%   BMI 28.52 kg/m²       Data Review:     Labs: Results:       Chemistry Recent Labs     10/21/22  0400 10/20/22  1950 10/20/22  1215 10/20/22  0515 10/19/22  1841 10/19/22  1841   * 192* 163* 133*  --  140*    140 143 143  --  143   K 3.8 3.8 3.3* 3.0*  --  3.4*    109 109 109  --  110   CO2 27 25 27 24  --  26   BUN 24* 22* 20* 20*  --  19*   CREA 1.19 1.45* 1.28 1.18  --  1.19   CA 8.0* 7.9* 8.0* 8.7  --  9.0   MG 2.4  --  2.6 2.1  --  1.6   PHOS 3.5 3.4 2.7 3.7   < >  --    AGAP 6 6 7 10  --  7   BUCR 20 15 16 17  --  16   AP  --   --   --   --   --  63   TP  --   --   --   --   --  7.2   ALB 3.0* 3.2* 3.1*  --   --  3.3*   GLOB  --   --   --   --   --  3.9   AGRAT  --   --   --   --   --  0.8    < > = values in this interval not displayed. CBC w/Diff Recent Labs     10/21/22  0400 10/20/22  0515 10/19/22  1841   WBC 12.5 10.7 10.2   RBC 3.50* 4.36 4.28*   HGB 9.7* 11.8* 11.9*   HCT 31.5* 37.9 37.5    215 248   GRANS 66 78* 86*   LYMPH 24 15* 7*   EOS 0 0 0      Cardiac Enzymes No results found for: CPK, CK, CKMMB, CKMB, RCK3, CKMBT, CKNDX, CKND1, LAURA, TROPT, TROIQ, LISA, TROPT, TNIPOC, BNP, BNPP   Coagulation Recent Labs     10/21/22  0400 10/20/22  1950 10/20/22  0515 10/19/22  1841   PTP  --   --   --  14.9   INR  --   --   --  1.1   APTT 48.2* 49.8*   < >  --     < > = values in this interval not displayed. Lipid Panel No results found for: CHOL, CHOLPOCT, CHOLX, CHLST, CHOLV, 806718, HDL, HDLP, LDL, LDLC, DLDLP, 382598, VLDLC, VLDL, TGLX, TRIGL, TRIGP, TGLPOCT, CHHD, CHHDX   BNP No results found for: BNP, BNPP, XBNPT   Liver Enzymes Recent Labs     10/21/22  0400 10/20/22  1215 10/19/22  1841   TP  --   --  7.2   ALB 3.0*   < > 3.3*   AP  --   --  63    < > = values in this interval not displayed.       Thyroid Studies Lab Results   Component Value Date/Time TSH 1.06 10/20/2022 05:15 AM          Signed By: Gina Goff PA-C     October 21, 2022

## 2022-10-21 NOTE — PROGRESS NOTES
10/20/22 2042   Patient Observations   Pulse (Heart Rate) 100   Resp Rate 18   O2 Sat (%) 100 %   Airway - Endotracheal Tube 10/20/22 Oral   Placement Date/Time: 10/20/22 0004   Number of Attempts: 1  Inserted By:   Present on Admission/Arrival: No  Location: Oral  Placement Verified: Auscultation;EtCO2;Chest x-ray  Airway Types: Endotracheal, cuffed  Airway Tube Size: 7.5 mm  An. .. Insertion Depth (cm) 24 cm   Line Kyle Lips   Side Secured Device; Left   Site Assessment Clean, dry, & intact   Respiratory   Respiratory (WDL) X   Patient on Vent Yes - If patient is on vent, add Doc Flowsheet Ventilator (). Respiratory Pattern Regular   Upper Airway Sounds Coarse   Chest/Tracheal Assessment Chest expansion, symmetrical   Breath Sounds Bilateral Clear;Diminished   Airway Clearance   Suction ET Tube   Suction Device Inline suction catheter   Sputum Method Obtained Endotracheal   Sputum Amount Scant   Sputum Color/Odor Yellow; White   Sputum Consistency Thick   Vent Settings   FIO2 (%) 60 %   SpO2/FIO2 Ratio 166.67   Back-Up Rate 12   PC Set 12   PEEP/VENT (cm H2O) 10 cm H20   Insp Time (sec) 0.9 sec   Insp Rise Time % 50 %   Flow Trigger 3   Ventilator Measurements   Resp Rate Observed 18   Vt Exhaled (Machine Breath) (ml) 420 ml   Ve Observed (l/min) 9.15 l/min   PIP Observed (cm H2O) 22 cm H2O   MAP (cm H2O) 13   I:E Ratio Actual 1:4.6   Safety & Alarms   Circuit Temperature 98.6 °F (37 °C)   Backup Mode Checked/Apnea Yes   Pressure Max 45 cm H2O   Ve Min 2   Ve Max 20   Vt Min 200 ml   Vt Max 810 ml   RR Max 40   Ambu Bag Yes   Ambu Mask Yes   Age Specific Ventilator Associated Pneumonia Bundle   Patient Age Group Adult   Adult Ventilator Associated Pneumonia Bundle   Elevation of Head to 30-45 Degrees (Unless Contraindicated) Yes   Oxygen Therapy   Skin Assessment Clean, dry, & intact   Vent Method/Mode   Ventilation Method Conventional   Ventilator Mode Pressure control   Pulmonary Toilet Pulmonary Toilet Suction;H. O.B elevated

## 2022-10-21 NOTE — PROGRESS NOTES
Bedside shift change report given to [address] (oncoming nurse) by [facility] Report included the following information SBAR and MAR.

## 2022-10-21 NOTE — INTERDISCIPLINARY ROUNDS
[facility]  Pulmonary, Critical Care, and Sleep Medicine  Interdisciplinary and Ventilator Weaning Rounds    Patient discussed in morning walking rounds and interdisciplinary rounds. ICU day: admitted 10/20    Overnight events: Worsening  hemodynamics overnight  Milrinone stopped due to worsening hypotension     Assessments and best practice:  Ventilator  Ventilator day 10/20  Vent settings: VC/VC+ with RR 20 and TV of 400 and FiO2 of 90 and PEEP of 10  VAP bundle, aim to keep peak plateau pressure 89-23FO H2O  Weaning assessed and documented   Patient does not meet criteria for SBT. Patient is not on sedation holiday. Plan to wean with PS na. Outcome: na   Final plan: na  Sedation  Fentanyl  and Versed  Other pertinent drips  Heparin, levophed, vasopressin, and epinephrine  Lines noted  Left Radial Arterial Line , L Fem CVL  Critical labs assessed  Yes  Antibiotics  none  Medications reviewed  Yes  Pending imaging  N/A  Pending send out labs  No  Pending Procedures  N/A  Glycemic control  Stress ulcer prophylaxis. Pepcid  DVT prophylaxis. Heparin gtt  Need for Lines, real assessed. Yes  Restraint Reevaluation     Yes  I have reevaluated the patient one hour after initiation of intervention. The patient is comfortable, uninjured, but continues to pose an imminent risk of injury to self to themselves and/or serious disruption of medical treatment required to keep patient stable. The patient's current medical and behavioral conditions that warrant the use intervention include danger to self and Interference with medical equipment or treatment. Restraint or seclusion will be discontinued at the earliest possible time, regardless of the scheduled expiration of the order.  Based on my evaluation, restraints will be continued: YES       Family contact/MPOA:   [facility] Other Relative [address] Other Relative [phone]     GONZALES STOUT AT [facility] [phone] Family updated: will call today     Palliative consult within 3 days of admission to ICU-  Ethics Guidance: 21 days      Daily Plans:  Call palliative care team when sister Ángela Husbands arrives (has paperwork for them)  Q8 labs    DAVID time 15 minutes        Gama Cadena PA-C  10/21/22  Pulmonary, [address] [facility] [facility]

## 2022-10-21 NOTE — ACP (ADVANCE CARE PLANNING)
Advance Care Planning     General Advance Care Planning (ACP) Conversation    Palliative Medicine    Visited patient along with Palliative team member RASHEED Heller NP. Patient lying in bed, eyes closed. Did not respond to verbal or tactile stimuli. Remains intubated with pressor support. Patient presented to the ED 10/19/22 with shortness of breath and hypoxia. Intubated and transferred to the ICU. PMH: A-Fib, CHF, CAD, NSTEMI. Pt does not have an Advance Directive on file in EMR. Is not able to completed on at this time. Call placed to sister Joseph Hernandez at [phone]. Medical update provided by Palliative NP. Monico Halima informed our team that patient lives with his other sister Pillo Sandoval, who has \"financial POA\" and Monico Varghese has \"medical POA\" . Monico Varghese will contact [facility] to bring the POA paperwork to the hospital with her to scan into chart. Sister confirmed that patient does not want \"CPR\" as they have discussed this in the past. Chart reviewed, noted patient had informed ED physician he did not want \"CPR\" prior to being intubated. 10/21/22 2:15 pm Met with sisters Monico Varghese and Lorna at bedside. [facility] provided our team a copy of the Medical Power of Casi paperwork. This document names his sister Joseph Hernandez as the primary medical decision maker, and Gagan Bower as the secondary decision maker. Medical update provided to sisters. They stated patient was never , did not have any children. 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 Was a heavy smoker and had poor eating habits, stopping at [facility] frequently. Moved in with sister Pillo Sandoval after his stay at [facility]. Sisters are very supportive of their brother, appreciated our meeting and all the information shared. Thank you for the Palliative Medicine consult and allowing us to participate in the care of Mr. Adolfo Barton. Will continue to monitor and provide support.     CODE STATUS: PARTIAL CODE, NO CHEST COMPRESSIONS OK WITH MEDICATIONS, SHOCK      Petey Rothman RN  Palliative Medicine Inpatient RN  [facility]  Palliative [facility] Line: [phone] ([phone])

## 2022-10-21 NOTE — CONSULTS
[facility] [place]: [phone]  [facility]: [phone]     Patient Name: Erica Luciano  YOB: 1955    Date of consult : 10/21/22  Reason for Consult: establish goals of care  Requesting Provider:  Bg AZUL   Primary Care Physician: Lyn Mcclelland MD      SUMMARY:   Erica Luciano is a 79 y.o. male with a past history of CHF with EF 20%, DVT, A. fib, who was admitted on 10/19/2022 from home with a diagnosis of acute CHF exacerbation and NSTEMI. Current medical issues leading to Palliative Medicine involvement include: Pt with a long term life limiting chronic disease process that warrants discussions about his goals of care. .    CHIEF COMPLAINT: Intubated and mechanically ventilated due to acute respiratory failure    HPI/SUBJECTIVE:    Patient is a 59-year-old -American male that lives at home with his sister Willy Black. He was brought in through the emergency department with complaints of shortness of breath and hypoxia. He later developed severe respiratory arrest and was intubated. Prior to respiratory distress he told the physicians that he did not want CPR/compressions and was put in as a partial code. The patient is:   [] Verbal and participatory  [x] Non-participatory due to: Intubated and sedated    GOALS OF CARE:  Patient/Health Care Proxy Stated Goals: Prolong life      TREATMENT PREFERENCES:   Code Status: Partial Code         PALLIATIVE DIAGNOSES:   Goals of care/ACP  Acute respiratory failure  Congestive heart failure  Encounter for palliative medicine       PLAN:   Goals of care/ACP  This NP along with Lovely Tenorio RN in to see patient at the bedside. He is intubated and mechanically ventilated on 3 pressor support and unable to participate in a goals of care discussion.   We have reached out to his Sister Mary Rios who states that she has legal medical power of  and that her sister Amado Nixon has financial power of  but we can speak to either of them. She reconfirmed his partial CODE STATUS stating that both of them have always stated that they would not want chest compressions at end-of-life. 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 He was a \"heavy\" smoker and had an overall poor diet eating [employer] foods for years. They also relay that their family has a long history of heart disease. Pt had been recuperating at home post his stay at [facility] when he became more SOB and reported some chest pain. I outlined some of our concerns for his recovery and they remain hopeful. Lorna did bring in a MPOA for her sister Nehemiah Wyman who was also in attendance at our meeting. Goals of care: Partial code no CPR with cardiopulmonary arrest okay with shocks, medication, and intubation  Acute respiratory failure  Patient intubated 2 days ago with now high PEEP of 10 and FiO2 of 40%. Patient initially admitted with hypoxic respiratory failure stabilized on 6 L but later went into severe respiratory distress requiring intubation. Congestive heart failure  Listed ejection fraction of 20% last echocardiogram shows 25 to 30%. Patient reported in acute congestive heart failure on arrival to the emergency department. Seen by cardiology noted patient in cardiogenic shock. Also reported patient had NSTEMI with elevated troponin patient declined for ECMO or candidate for CABG/PCI per [facility] cardiology. Cardiac MRI shows enlarged LV with severe decreased function LVEF of 23.9%  Encounter for palliative medicine  Patient has extensive cardiac history now with exacerbation of his congestive heart failure and NSTEMI. Likely poor long-term outcomes. Patient supported with 3 pressors.   Have spoken to his Sister Lacho HarrydaveANGELES to inform her that we may need to set up a meeting to have further discussions depending on how patient does with weaning from ventilator and pressor supports. Our team will continue to follow. Initial consult note routed to primary continuity provider  Communicated plan of care with: Palliative IDT      Advance Care Planning:  [] [facility] Interdisciplinary Team has updated the ACP Navigator with [address] and Patient Capacity    [facility] ([address]):   [facility] - [phone]    Medical Interventions: Limited additional interventions   Other Instructions:         As far as possible, the palliative care team has discussed with patient / health care proxy about goals of care / treatment preferences for patient. HISTORY:     History obtained from: Chart review   Active Problems:    Respiratory failure with hypoxia ([address] (10/20/2022)    Past Medical History:   Diagnosis Date    Elevated PSA     Mental health disorder       Past Surgical History:   Procedure Laterality Date    HX COLONOSCOPY        Family History   Family history unknown: Yes     History reviewed, no pertinent family history.   Social History     Tobacco Use    Smoking status: Every Day    Smokeless tobacco: Not on file   Substance Use Topics    Alcohol use: No     No Known Allergies   Current Facility-Administered Medications   Medication Dose Route Frequency    [Held by provider] furosemide (LASIX) 100 mg in 0.9% sodium chloride (MBP/ADV) 110 mL infusion  10 mg/hr IntraVENous CONTINUOUS    sodium chloride (NS) flush 5-40 mL  5-40 mL IntraVENous Q8H    sodium chloride (NS) flush 5-40 mL  5-40 mL IntraVENous PRN    ondansetron (ZOFRAN ODT) tablet 4 mg  4 mg Oral Q8H PRN    Or    ondansetron (ZOFRAN) injection 4 mg  4 mg IntraVENous Q6H PRN    famotidine (PF) (PEPCID) 20 mg in 0.9% sodium chloride 10 mL injection  20 mg IntraVENous Q12H    midazolam (VERSED) injection 2 mg  2 mg IntraVENous Q10MIN PRN fentaNYL citrate (PF) injection 100 mcg  100 mcg IntraVENous Q30MIN PRN    chlorhexidine (PERIDEX) 0.12 % mouthwash 10 mL  10 mL Oral Q12H    fentaNYL (PF) 1,500 mcg/30 mL (50 mcg/mL) infusion  0-200 mcg/hr IntraVENous TITRATE    midazolam in normal saline (VERSED) 1 mg/mL infusion  0-10 mg/hr IntraVENous TITRATE    heparin (porcine) 25,000 units in 0.45% saline 250 ml infusion  11.79-25 Units/kg/hr IntraVENous TITRATE    [Held by provider] milrinone (PRIMACOR) 20 MG/100 ML D5W infusion  0.375 mcg/kg/min IntraVENous CONTINUOUS    aspirin chewable tablet 81 mg  81 mg Per NG tube DAILY    atorvastatin (LIPITOR) tablet 40 mg  40 mg Per NG tube QHS    EPINEPHrine (ADRENALIN) 4 mg in 0.9% sodium chloride 250 mL infusion  1-10 mcg/min IntraVENous TITRATE    vasopressin (VASOSTRICT) 20 Units in 0.9% sodium chloride 100 mL infusion  0.03 Units/min IntraVENous CONTINUOUS    NOREPINephrine (LEVOPHED) 32,000 mcg in dextrose 5% 250 mL (128 mcg/mL) infusion  0.5-50 mcg/min IntraVENous TITRATE    acetaminophen (TYLENOL) suppository 650 mg  650 mg Rectal Q4H PRN          Clinical Pain Assessment (nonverbal scale for nonverbal patients): Activity (Movement): Lying quietly, normal position    Duration: for how long has pt been experiencing pain (e.g., 2 days, 1 month, years)  Frequency: how often pain is an issue (e.g., several times per day, once every few days, constant)     FUNCTIONAL ASSESSMENT:     Palliative Performance Scale (PPS):  PPS: 40    ECOG  ECOG Status : Completely disabled     PSYCHOSOCIAL/SPIRITUAL SCREENING:      Any spiritual / Anabaptism concerns:  [] Yes /  [x] No    Caregiver Burnout:  [] Yes /  [x] No /  [] No Caregiver Present      Anticipatory grief assessment:   [x] Normal  / [] Maladaptive        REVIEW OF SYSTEMS:     Systems: constitutional, ears/nose/mouth/throat, respiratory, gastrointestinal, genitourinary, musculoskeletal, integumentary, neurologic, psychiatric, endocrine.  Positive findings noted below. Modified ESAS Completed by: provider                       Dyspnea: 5               Positive and pertinent negative findings in ROS are noted above in HPI. The following systems were [x] reviewed / [] unable to be reviewed as noted in HPI  Other findings are noted below. PHYSICAL EXAM:     Constitutional: intubated and sedated   Eyes: pupils equal, anicteric  ENMT: intubated   Cardiovascular: hypotensive on pressor support  Respiratory: mechanically ventilated PEEP 10 with Fi02 40%  Gastrointestinal: soft non-tender, +bowel sounds  Last bowel movement:   Musculoskeletal: no deformity, no tenderness to palpation  Skin: warm, dry  Neurologic: following commands, moving all extremities  Psychiatric: full affect, no hallucinations    Other: Wt Readings from Last 3 Encounters:   10/21/22 82.6 kg (182 lb 1.6 oz)   11/16/17 95.3 kg (210 lb)   05/22/15 87.1 kg (192 lb)     Blood pressure 110/72, pulse (!) 108, temperature 100 °F (37.8 °C), resp. rate 22, height 5' 7\" (1.702 m), weight 82.6 kg (182 lb 1.6 oz), SpO2 100 %. Pain:  Pain Scale 1: Adult Nonverbal Pain Scale                         LAB AND IMAGING FINDINGS:     Lab Results   Component Value Date/Time    WBC 12.5 10/21/2022 04:00 AM    HGB 9.7 (L) 10/21/2022 04:00 AM    PLATELET 015 52/53/9207 04:00 AM     Lab Results   Component Value Date/Time    Sodium 141 10/21/2022 04:00 AM    Potassium 3.8 10/21/2022 04:00 AM    Chloride 108 10/21/2022 04:00 AM    CO2 27 10/21/2022 04:00 AM    BUN 24 (H) 10/21/2022 04:00 AM    Creatinine 1.19 10/21/2022 04:00 AM    Calcium 8.0 (L) 10/21/2022 04:00 AM    Magnesium 2.4 10/21/2022 04:00 AM    Phosphorus 3.5 10/21/2022 04:00 AM      Lab Results   Component Value Date/Time    Alk.  phosphatase 63 10/19/2022 06:41 PM    Protein, total 7.2 10/19/2022 06:41 PM    Albumin 3.0 (L) 10/21/2022 04:00 AM    Globulin 3.9 10/19/2022 06:41 PM     Lab Results   Component Value Date/Time    INR 1.1 10/19/2022 06:41 PM Prothrombin time 14.9 10/19/2022 06:41 PM    aPTT 48.2 (H) 10/21/2022 04:00 AM      No results found for: IRON, FE, TIBC, IBCT, PSAT, FERR   No results found for: PH, PCO2, PO2  No components found for: [place]   Lab Results   Component Value Date/Time     03/16/2011 01:56 AM    CK - MB 1.9 03/16/2011 01:56 AM              Total time: 50 minutes   Counseling / coordination time, spent as noted above:   > 50% counseling / coordination:  Time spent in direct consultation with the patient, medical team, and family     Prolonged service was provided for  []30 min   []75 min in face to face time in the presence of the patient, spent as noted above. Time Start:   Time End:     Disclaimer: Sections of this note are dictated using utilizing voice recognition software, which may have resulted in some phonetic based errors in grammar and contents. Even though attempts were made to correct all the mistakes, some may have been missed, and remained in the body of the document. If questions arise, please contact our department.

## 2022-10-22 NOTE — PROGRESS NOTES
[facility] - Progress Note  Admit Date: 10/19/2022    Assessment:      -Acute hypoxic respiratory failure, requiring intubation and mechanical ventilation.    -Cardiogenic shock, on pressor support. -A/c HFrEF/ICMY. CXR with pulmonary edema. LVEF 20-30%  -NSTEMI, troponin peaked at 8773.   -3V CAD. S/p LHC in setting of late presentation MI s/p IABP. Not a candidate for CABG/PCI at [facility] cardiology. No viability by cardiac MRI at [facility] along anterior wall. Cath findings as noted below:   Left main coronary artery: Short, patent   Left anterior descending coronary artery: 50% ostial; 99% mid   BRAULIO 1 flow to mid/distal LAD with right to left collaterals 70% diagonal 1   Left circumflex coronary artery: Non-dominant, 100% proximal   Distal fed by right to left collaterals   Right coronary artery:   Dominant, diffuse 80% proximal to mid 70% at crux into posterior descending artery   Short posterolateral branch   Collaterals to left circumflex and left anterior descending    Cardiac MRI  Enlarged LV with severely decreased function. LVEF of 23.9%. No viability in the proximal mid to distal LAD and and basal to apical circumflex territory. 4. Approximately 80% viability in the RCA territory. -PAF, on amiodarone and Eliquis as outpatient   -HTN, on Entresto, aldactone, lasix and lopressor as outpatient. -HLD  -DM  -H/o LLE DVT. -Tobacco abuse   -Partial code. Primary cardiologist is at [facility]. Plan:     Increasing ectopy overnight, ongoing pressor support, high dose. Patient has severe multivessel CAD with extensive scar. No viability along anterior wall by MRI  [facility], reviewed by me. This is end stage process without options. Ongoing discussions about goals of care. Consider amiodarone if sustained atrial or ventricular tachyarrhythmias. Discussed with team.    Subjective: Increasing ectopy, remains intubated, high dose pressors.     Objective:      Patient Vitals for the past 8 hrs:   Temp Pulse Resp BP SpO2   10/22/22 0935 -- (!) 127 18 -- 99 %   10/22/22 0700 99.1 °F (37.3 °C) 85 12 112/67 99 %   10/22/22 0600 99.5 °F (37.5 °C) 94 14 117/72 99 %   10/22/22 0500 99.3 °F (37.4 °C) 85 11 120/72 99 %   10/22/22 0400 99.3 °F (37.4 °C) 88 11 113/64 99 %   10/22/22 0315 -- 90 17 -- 100 %         Patient Vitals for the past 96 hrs:   Weight   10/22/22 0500 81.6 kg (179 lb 14.3 oz)   10/21/22 0400 82.6 kg (182 lb 1.6 oz)   10/20/22 1414 81.6 kg (180 lb)   10/20/22 0936 81.6 kg (180 lb)   10/20/22 0552 81.7 kg (180 lb 1.9 oz)   10/19/22 2128 84.8 kg (187 lb)                    Intake/Output Summary (Last 24 hours) at 10/22/2022 1105  Last data filed at 10/22/2022 7140  Gross per 24 hour   Intake 1676.05 ml   Output 3130 ml   Net -1453.95 ml       Physical Exam:  General:  intubated  Neck:  nontender  Lungs:  clear to auscultation bilaterally  Heart:  regular rate and rhythm, S1, S2 normal, no murmur, click, rub or gallop  Abdomen:  abdomen is soft without significant tenderness, masses, organomegaly or guarding  Extremities:  extremities normal, atraumatic, no cyanosis or edema    Data Review:     Labs: Results:       Chemistry Recent Labs     10/22/22  0350 10/21/22  2130 10/21/22  1322 10/21/22  0400 10/20/22  1950 10/20/22  1215 10/20/22  0515 10/19/22  1841   * 166* 167* 168*   < > 163*   < > 140*    140 142 141   < > 143   < > 143   K 3.7 3.7 4.1 3.8   < > 3.3*   < > 3.4*    104 108 108   < > 109   < > 110   CO2 30 30 27 27   < > 27   < > 26   BUN 20* 20* 24* 24*   < > 20*   < > 19*   CREA 0.92 1.02 0.94 1.19   < > 1.28   < > 1.19   CA 8.6 8.3* 7.8* 8.0*   < > 8.0*   < > 9.0   MG 2.2  --   --  2.4  --  2.6   < > 1.6   PHOS 2.2* 2.2* 2.8 3.5   < > 2.7   < >  --    AGAP 7 6 7 6   < > 7   < > 7   BUCR 22* 20 26* 20   < > 16   < > 16   AP  --   --   --   --   --   --   --  63   TP  --   --   --   --   --   --   --  7.2   ALB 2.8* 3.0* 3.1* 3.0*   < > 3.1*  --  3.3* GLOB  --   --   --   --   --   --   --  3.9   AGRAT  --   --   --   --   --   --   --  0.8    < > = values in this interval not displayed. CBC w/Diff Recent Labs     10/22/22  0350 10/21/22  0400 10/20/22  0515   WBC 10.5 12.5 10.7   RBC 3.34* 3.50* 4.36   HGB 9.2* 9.7* 11.8*   HCT 30.6* 31.5* 37.9    247 215   GRANS 73 66 78*   LYMPH 18* 24 15*   EOS 1 0 0      Cardiac Enzymes No results found for: CPK, CK, CKMMB, CKMB, RCK3, CKMBT, CKNDX, CKND1, LAURA, TROPT, TROIQ, LISA, TROPT, TNIPOC, BNP, BNPP   Coagulation Recent Labs     10/22/22  0350 10/21/22  2130 10/20/22  0515 10/19/22  1841   PTP  --   --   --  14.9   INR  --   --   --  1.1   APTT 58.6* 64.0*   < >  --     < > = values in this interval not displayed. Lipid Panel No results found for: CHOL, CHOLPOCT, CHOLX, CHLST, CHOLV, 838730, HDL, HDLP, LDL, LDLC, DLDLP, 278483, VLDLC, VLDL, TGLX, TRIGL, TRIGP, TGLPOCT, CHHD, CHHDX   BNP No results found for: BNP, BNPP, XBNPT   Liver Enzymes Recent Labs     10/22/22  0350 10/20/22  1215 10/19/22  1841   TP  --   --  7.2   ALB 2.8*   < > 3.3*   AP  --   --  63    < > = values in this interval not displayed.       Digoxin    Thyroid Studies Lab Results   Component Value Date/Time    TSH 1.06 10/20/2022 05:15 AM          Signed By: Franky Burgess MD     October 22, 2022

## 2022-10-22 NOTE — PROGRESS NOTES
[facility] Pharmacokinetic Monitoring Service - Vancomycin    Indication: sepsis  Goal AUC/JOHANNA: 400-600 mg*hr/L  Day of Therapy: 1  Additional Antimicrobials: pip-tazo    Pertinent Laboratory Values: Wt Readings from Last 1 Encounters:   10/22/22 81.6 kg (179 lb 14.3 oz)     Temp Readings from Last 1 Encounters:   10/22/22 98.8 °F (37.1 °C)     No components found for: PROCAL  Estimated Creatinine Clearance: 78.8 mL/min (based on SCr of 0.93 mg/dL). Recent Labs     10/22/22  0350 10/21/22  0400   WBC 10.5 12.5     Pertinent Cultures:  Culture Date Source Results   10/20 blood GPC in 1/2   10/20 sputum GNR   MRSA Nasal Swab: not ordered. Order placed by pharmacy.     Assessment:  Date/Time Current Dose Concentration (mg/L) Timing of Concentration (h) AUC   10/22 2,000 mg x1 - - -   Note: Serum concentrations collected for AUC dosing may appear elevated if collected in close proximity to the dose administered, this is not necessarily an indication of toxicity    Plan:  Loading dose: 2,000 mg x1  Maintenance dose: 1,000 mg q12h  No level ordered at this time  Pharmacy will continue to monitor patient and adjust therapy as indicated    Thank you for the consult,  Sal Koch, [address]  10/22/2022

## 2022-10-22 NOTE — PROGRESS NOTES
Nutrition Note    Pt continues to be NPO due to pressor requirements. Per chart, pt currently on levo @ 50 mcg/min, epi @ 4 mcg/min, vaso @ 0.03 units/min      Nutrition Recommendations/Plan:   Rec continue NPO status d/t pressor requirements. Once/if pressor requirements decrease and only requiring 1-2 low-moderate dose, can consider tube feeds at trickle rate. Continue to monitor readiness of EN, weight, labs, and plan of care during admission.          Electronically signed by Evangelista Xiong RD on 10/22/2022 at 11:10 AM    Contact: [phone]

## 2022-10-22 NOTE — PROGRESS NOTES
2330: Patient noted to have HR  of 140 bpm-150 bpm,stat EKG done that showed Sinus Tachycardia. Provider in attendance. Bedside shift change report given to [address] (oncoming nurse) by Helga Caldwell (offgoing nurse). Report included the following information SBAR.

## 2022-10-22 NOTE — PROGRESS NOTES
[facility]. Pulmonary, Critical Care, and Sleep Medicine    Name: Kamilla Godoy MRN: [id number]   : 1955 Hospital: [address] [place] [address] Dr   Date: 10/22/2022  Admission Date: 10/19/2022     Chart and notes reviewed. Data reviewed. I have evaluated all findings. [x]I have reviewed the flowsheet and previous days notes. [x]The patient is unable to give any meaningful history or review of systems because the patient is:  [x]Intubated [x]Sedated   []Unresponsive      [x]The patient is critically ill on      [x]Mechanical ventilation [x]Pressors   []BiPAP []         Interval HPI: 79year old with a past medical history of severe CHF (EF 20%), 3 vessel CAD, DVT, A fib, admitted to the unit with acute hypoxic respiratory failure and cardiogenic shock likely 2/2 acute on chronic CHF exacerbation and NSTEMI (on heparin gtt). Pt is s/p lasix gtt. Milrinone was stopped 2/2 worsening hypotension. Transfer to [facility] for initiation of ECMO, IABP, etc was declined. Subjective 10/22/22  Hospital Day: 2  Vent Day: 2    - No new acute overnight events  - Patient remains on multiple vasopressors (epi, Levophed, vaso)  - Multiple arrhythmias/ectopy overnight--sinus tach with PVCs vs V tach vs Afib RVR, All non-sustained. E-lytes stable. Possibly hypovolemic in setting of over diuresis? - Patient is alert well sedated with fentanyl and Versed  - Adequate UOP  - Noted slow downtrend of Hgb, remains >9.  - Noted downtrend of troponin                  ROS:Review of systems not obtained due to patient factors. Events and notes from last 24 hours reviewed.      Patient Active Problem List   Diagnosis Code    Elevated prostate specific antigen (PSA) R97.20    Spermatocele of epididymis N43.40    BPH with obstruction/lower urinary tract symptoms N40.1, N13.8    Respiratory failure with hypoxia (HCC) J96.91       Vital Signs:  Visit Vitals  /85   Pulse 94   Temp 99.1 °F (37.3 °C)   Resp 18  5' 7\" (1.702 m)   Wt 81.6 kg (179 lb 14.3 oz)   SpO2 99%   BMI 28.18 kg/m²       O2 Device: Ventilator   O2 Flow Rate (L/min): 40 l/min   Temp (24hrs), Av.1 °F (37.8 °C), Min:99.1 °F (37.3 °C), Max:100.9 °F (38.3 °C)       Intake/Output:   Last shift:      No intake/output data recorded.   Last 3 shifts: 10/20 1901 - 10/22 0700  In: 2986.2 [I.V.:2986.2]  Out: 2773 [Urine:3770]    Intake/Output Summary (Last 24 hours) at 10/22/2022 1518  Last data filed at 10/22/2022 5979  Gross per 24 hour   Intake 1676.05 ml   Output 2330 ml   Net -653.95 ml            Current Facility-Administered Medications   Medication Dose Route Frequency    furosemide (LASIX) injection 40 mg  40 mg IntraVENous BID    insulin lispro (HUMALOG) injection   SubCUTAneous Q6H    sodium chloride (NS) flush 5-40 mL  5-40 mL IntraVENous Q8H    famotidine (PF) (PEPCID) 20 mg in 0.9% sodium chloride 10 mL injection  20 mg IntraVENous Q12H    chlorhexidine (PERIDEX) 0.12 % mouthwash 10 mL  10 mL Oral Q12H    fentaNYL (PF) 1,500 mcg/30 mL (50 mcg/mL) infusion  0-200 mcg/hr IntraVENous TITRATE    midazolam in normal saline (VERSED) 1 mg/mL infusion  0-10 mg/hr IntraVENous TITRATE    heparin (porcine) 25,000 units in 0.45% saline 250 ml infusion  11.79-25 Units/kg/hr IntraVENous TITRATE    [Held by provider] milrinone (PRIMACOR) 20 MG/100 ML D5W infusion  0.375 mcg/kg/min IntraVENous CONTINUOUS    aspirin chewable tablet 81 mg  81 mg Per NG tube DAILY    atorvastatin (LIPITOR) tablet 40 mg  40 mg Per NG tube QHS    EPINEPHrine (ADRENALIN) 4 mg in 0.9% sodium chloride 250 mL infusion  1-10 mcg/min IntraVENous TITRATE    vasopressin (VASOSTRICT) 20 Units in 0.9% sodium chloride 100 mL infusion  0.03 Units/min IntraVENous CONTINUOUS    NOREPINephrine (LEVOPHED) 32,000 mcg in dextrose 5% 250 mL (128 mcg/mL) infusion  0.5-50 mcg/min IntraVENous TITRATE         Telemetry: []Sinus []A-flutter []Paced    []A-fib []Multiple PVCs                  Physical Exam:      General:  Intubated/sedated, but will open eyes and shake head to yes/no questions. NAD   Head:  Normocephalic, without obvious abnormality, atraumatic. Eyes:  Conjunctivae/corneas clear. PERRL,   Nose: Nares normal. Septum midline. Mucosa normal. No drainage or sinus tenderness. Throat: Lips, mucosa, and tongue normal. Teeth and gums normal.   Neck: Supple, symmetrical, trachea midline, no adenopathy, thyroid: no enlargment/tenderness/nodules, no carotid bruit and no JVD. Back:   Symmetric, no curvature. ROM normal.   Lungs:   (+)diffuse rales bilaterally   Chest wall:  No tenderness or deformity. Heart:  Regular rate and rhythm, S1, S2 normal, no murmur, click, rub or gallop. Abdomen:   Soft, non-tender. Bowel sounds normal. No masses,  No organomegaly. Extremities: Extremities normal, atraumatic, no cyanosis or edema. - restraints    Pulses: 2+ and symmetric all extremities.    Skin: Skin color, texture, turgor normal. No rashes or lesions   Lymph nodes:  Cervical, supraclavicular, and axillary nodes normal.   Neurologic: Grossly nonfocal         DATA:  MAR reviewed and pertinent medications noted or modified as needed    Labs:  Recent Labs     10/22/22  0350 10/21/22  0400 10/20/22  0515   WBC 10.5 12.5 10.7   HGB 9.2* 9.7* 11.8*   HCT 30.6* 31.5* 37.9    247 215       Recent Labs     10/22/22  1238 10/22/22  0350 10/21/22  2130 10/21/22  1322 10/21/22  0400 10/20/22  1950 10/20/22  1215 10/20/22  0515 10/19/22  1841    140 140   < > 141   < > 143   < > 143   K 3.9 3.7 3.7   < > 3.8   < > 3.3*   < > 3.4*    103 104   < > 108   < > 109   < > 110   CO2 31 30 30   < > 27   < > 27   < > 26   * 155* 166*   < > 168*   < > 163*   < > 140*   BUN 19* 20* 20*   < > 24*   < > 20*   < > 19*   CREA 0.93 0.92 1.02   < > 1.19   < > 1.28   < > 1.19   CA 8.6 8.6 8.3*   < > 8.0*   < > 8.0*   < > 9.0   MG  --  2.2  --   --  2.4  --  2.6   < > 1.6   PHOS 2.3* 2.2* 2.2*   < > 3.5   < > 2.7   < >  --    ALB 3.1* 2.8* 3.0*   < > 3.0*   < > 3.1*  --  3.3*   ALT  --   --   --   --   --   --   --   --  16   INR  --   --   --   --   --   --   --   --  1.1    < > = values in this interval not displayed. No results for input(s): PH, PCO2, PO2, HCO3, FIO2 in the last 72 hours. Recent Labs     10/22/22  0319 10/21/22  0355 10/20/22  1600   FIO2I 40 60 75   HCO3I 30.4* 26.3* 24.1   PCO2I 57.0* 51.6* 45.0   PHI 7.34* 7.32* 7.34*   PO2I 120* 168* 248*         Imaging:  [x]   I have personally reviewed the patients radiographs and reports  XR Results (most recent):  XR Results (most recent):  Results from Hospital Encounter encounter on 10/19/22    XR CHEST PORT    Narrative  CHEST PORTABLE 0320 hours    COMPARISON: 2/21. INDICATIONS: Intubated. FINDINGS:    Portable single view chest demonstrates:    Lungs: Hyper aerated for portable technique. The bilateral infiltrates show  significant improvement when compared with the previous study. Cardiac Silhouette And Mediastinal Contours: Normal.    Pleural Spaces: No pneumothorax or pleural effusion evident. Bones And Soft Tissues: Unremarkable for age. Endotracheal tube remains slightly over 4 cm above the onofre. The gastric tube  extends into the stomach. Impression  Significant interval improvement in the infiltrates. Such rapid improvement  indicates the etiology was edema or hemorrhage. Satisfactory tube placement     CT Results (most recent):  Results from Hospital Encounter encounter on 10/19/22    CTA CHEST W OR W WO CONT    Narrative  EXAM: CT Angiogram of the Chest    CLINICAL INDICATION:  hypoxic    TECHNIQUE: CT angiogram of the chest performed following intravenous contrast  administration. MIP reconstructions were performed.     All CT scans at this facility are performed using dose optimization technique as  appropriate to a performed exam, to include automated exposure control,  adjustment of the mA and/or kV according to patient size (including appropriate  matching for site specific examination) or use of iterative reconstruction  technique. COMPARISON: Radiograph 2 hours prior    FINDINGS:  Limitations: Technically adequate for evaluation of proximal vessels. Pulmonary Arteries: No evidence of central, lobar or proximal segmental  pulmonary embolus. More distal branches are somewhat evaluated due to  respiratory motion and pulmonary consolidation. Aorta: No evidence of aortic aneurysm. Heart: Mild enlargement of the left ventricle with possible apical thinning. Pericardium: No pericardial effusion. Lungs: Deep tendon consolidation in bilateral lower lungs, with bilateral upper  lung groundglass opacities with relative subpleural sparing. Pleura: Moderate right, small left pleural effusion. Trachea and Bronchi: Endotracheal tube 3.4 cm above the onofre. Lower neck: Dense contrast pooling within the left subclavian vein. Left level 6  lymph node measuring up to 1.6 x 1.1 cm. Axilla/Chest wall: Small bilateral axillary lymph nodes, without pathologic  features. Pinky Angles Upper Abdomen: Large cyst right kidney measuring 5.2 cm, 2.5 cm in the left  kidney. Cholecystectomy. Gastric tube terminating within the gastric body. Mild  reflux of contrast into the IVC. Musculoskeletal: No acute osseous findings. Impression  1. No CT evidence of pulmonary embolus. 2.  Diffuse bilateral pulmonary alveolar opacities with dependent consolidation. Overall appearance is suggestive of diffuse alveolar dysfunction, including  hydrostatic pulmonary edema, alveolar hemorrhage, RDS. Bilateral pleural  effusions make pulmonary edema most likely. IMPRESSION:   Acute Hypoxic Respiratory Failure - Requiring mechanical ventilation likely 2/2 CHF exacerbation with Cardiogenic shock on multiple vasopressor. Cardiogenic Shock - In the setting of acute HF and NSTEMI - on levo, vaso, epi with stable hemodynamics.   Acute on chronic decompensated systolic heart failure - Echo 7/22 EF 20%  NSTEMI - initial troponin 5K, on heparin gtt  Bacteremia? - BxCx(10/20) growing GPC in aerobic bottle. Suspect contaminant? Afebrile, Aleukocytosis. Will repeat check LA and PCT. 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 RASS 0 to -1. PRN Fentanyl / Versed for breakthrough sedation. Pulm: Titrate FiO2 for SpO2 >90%. Optimize bronchial hygiene. Daily CXR and ABG while intubated. CVS: Con't vasopressors -- Levophed gtt, shock dose Vaso and Epi gtt. Currently, pt is hemodynamically stable with SBP in the 110s on [place] with current pressor support. Intermittent ectopy/sinus tach/arrhythmias overnight. Noted some improvement s/p albumin bolus this AM.  If NVT or atrial tachyarrhythmias continue, may trial amiodarone bolus/gtt. Goal MAP >65mmHg. S/p Milrinone gtt and Lasix gtt (worsening hypotension). Con't daily ASA. Echo (10/20) EF 25-30%. Cardiology following -- recommends Comfort care. No further interventions recommended as pt has no viability along anterior wall by MRI at [facility]. Pt therefore also not a candidate for aortic balloon pump or Impella device given that he is not a transplant candidate. Con't Heparin gtt. Hold Lasix today (10/22). GI: NPO. SUP(pepcid)  Renal: Trend Cr, UOP. Petty.  Strict I/Os  Hem/Onc: Trend H/H, monitor for s/o active bleeding. Daily CBC. I/D:Trend WBCs and temperature curve. Noted (+) BxCx growing GPC --> likely contaminant. Repeat BxCx, check LA and PCT. Afebrile and leukocytosis. Will start Vanc/Zosyn for now. Trend WBC and temp curve. Metabolic: Daily BMP, mag, phos. Trend lytes and replace per protocol. Endocrine:Q6 glucoses. SSI. Avoid hypoglycemia. Musc/Skin: wound care prn  Partial Code  Discussed in interdisciplinary rounds     Best Practices/Safety Bundles:  Sepsis Bundle per Hospital Protocol  Glycemic control; avoid Hypoglycemia  IHI ICU Bundles:   Central Line Bundle Followed , Real Bundle Followed, and Vent Bundle Followed, Vent Day 2    [facility] Vent patients/ Pulmonary pts:   VAP bundle, Aim to keep peak plateau pressure 67-97CD H2O  Aspiration Precautions - HOB >30'  Daily sedation holiday as indicated  SBT as tolerated/appropriate  Stress Ulcer prophylaxis: Pepcid   DVT prophylaxis: Heparin gtt  Need for Lines, real assessed. Restraints need. Not required. D/C'd today (10/22).            DAVID time 20 mins    Shirin Calderón   10/22/22  Pulmonary, Critical Care Medicine  [facility]

## 2022-10-22 NOTE — PROGRESS NOTES
Pharmacy Note     Zosyn 3.375 gm q6h was ordered for treatment of Sepsis of Unknown Origin. Per [address] [place] [address], this order will be changed to 4.5 gm x 30 mins, followed by 3.375 gm q8h to be infused over 240 mins. Estimated Creatinine Clearance: Estimated Creatinine Clearance: 78.8 mL/min (based on SCr of 0.93 mg/dL). Dialysis Status, CLAUDIA, CKD: N/A    BMI:  Body mass index is 28.18 kg/m². Rationale for Adjustment:  [facility] B-Lactam extended infusion policy    Pharmacy will continue to monitor and adjust dose as necessary. Please call with any questions.     Thank you,  Dave Khanna, PHARMD

## 2022-10-23 NOTE — PROGRESS NOTES
No SBT at this time-     10/23/22 1012   Weaning Parameters   Spontaneous Breathing Trial Complete No (Comments)  (High PEEP and FiO2 with increasing O2 requirement and worsening CXR)

## 2022-10-23 NOTE — PROGRESS NOTES
[facility] Pharmacokinetic Monitoring Service - Vancomycin    Indication: sepsis  Goal AUC/JOHANNA: 400-600 mg*hr/L  Day of Therapy: 2  Additional Antimicrobials: pip-tazo    Pertinent Laboratory Values: Wt Readings from Last 1 Encounters:   10/22/22 81.6 kg (179 lb 14.3 oz)     Temp Readings from Last 1 Encounters:   10/23/22 98.3 °F (36.8 °C)     No components found for: PROCAL  Estimated Creatinine Clearance: 95.2 mL/min (based on SCr of 0.77 mg/dL).   Recent Labs     10/23/22  0334 10/22/22  0350   WBC 9.2 10.5     Pertinent Cultures:  Culture Date Source Results   10/20 blood CoNS in 1/2   10/20 sputum  Few E coli   MRSA Nasal Swab: awaiting result    Assessment:  Date/Time Current Dose Concentration (mg/L) Timing of Concentration (h) AUC   10/22 2,000 mg x1 - - -   10/23 1,000 mg q12h - - -   Note: Serum concentrations collected for AUC dosing may appear elevated if collected in close proximity to the dose administered, this is not necessarily an indication of toxicity    Plan:  Continue current dose of 1,000 mg q12h  Ordered a level for 10/24 with AM labs  Pharmacy will continue to monitor patient and adjust therapy as indicated    Thank you for the consult,  HANNA Munoz  10/23/2022

## 2022-10-23 NOTE — PROGRESS NOTES
[facility]. Pulmonary, Critical Care, and Sleep Medicine    Name: Greg Denver MRN: [id number]   : 1955 Hospital: [address] [place] [address] Dr   Date: 10/23/2022  Admission Date: 10/19/2022     Chart and notes reviewed. Data reviewed. I have evaluated all findings. [x]I have reviewed the flowsheet and previous days notes. [x]The patient is unable to give any meaningful history or review of systems because the patient is:  [x]Intubated [x]Sedated   []Unresponsive      [x]The patient is critically ill on      [x]Mechanical ventilation [x]Pressors   []BiPAP []         Interval HPI: 79year old with a past medical history of severe CHF (EF 20%), 3 vessel CAD, DVT, A fib, admitted to the unit with acute hypoxic respiratory failure and cardiogenic shock likely 2/2 acute on chronic CHF exacerbation and NSTEMI (on heparin gtt). Pt is s/p lasix gtt. Milrinone was stopped 2/2 worsening hypotension. Transfer to [facility] for initiation of ECMO, IABP, etc was declined. Subjective 10/23/22  Hospital Day: 3  Vent Day: 3    - No new acute overnight events  - Patient remains on multiple vasopressors (epi, Levophed, vaso)  - Amio gtt added overnight, HR and rhythm improved. Ectopy resolved. - Patient will wake and track while sedated with fentanyl and Versed  - Adequate UOP, 450cc UOP overnight  - Noted slow downtrend of Hgb, 8.7 this AM.  - Sputum Cx (10/20) growing E. Coli as of 10/23                  ROS:Review of systems not obtained due to patient factors. Events and notes from last 24 hours reviewed.      Patient Active Problem List   Diagnosis Code    Elevated prostate specific antigen (PSA) R97.20    Spermatocele of epididymis N43.40    BPH with obstruction/lower urinary tract symptoms N40.1, N13.8    Respiratory failure with hypoxia (HCC) J96.91       Vital Signs:  Visit Vitals  /86   Pulse 87   Temp 98.3 °F (36.8 °C)   Resp 22   Ht 5' 7\" (1.702 m)   Wt 81.6 kg (179 lb 14.3 oz) SpO2 100%   BMI 28.18 kg/m²       O2 Device: Ventilator, Endotracheal tube, Heated, Humidifier   O2 Flow Rate (L/min): 40 l/min   Temp (24hrs), Av.7 °F (37.1 °C), Min:96.4 °F (35.8 °C), Max:100.2 °F (37.9 °C)       Intake/Output:   Last shift:      10/23 0701 - 10/23 1900  In: 250 [I.V.:250]  Out: 40 [Urine:40]  Last 3 shifts: 10/21 1901 - 10/23 0700  In: 6596 [I.V.:3430]  Out: 2917 [Urine:2480]    Intake/Output Summary (Last 24 hours) at 10/23/2022 1054  Last data filed at 10/23/2022 0915  Gross per 24 hour   Intake 2852.37 ml   Output 940 ml   Net 1912.37 ml            Current Facility-Administered Medications   Medication Dose Route Frequency    potassium phosphate 10 mmol in 0.9% sodium chloride 250 mL infusion   IntraVENous ONCE    piperacillin-tazobactam (ZOSYN) 3.375 g in 0.9% sodium chloride (MBP/ADV) 100 mL MBP  3.375 g IntraVENous Q8H    vancomycin (VANCOCIN) 1,000 mg in 0.9% sodium chloride 250 mL (VIAL-MATE)  1,000 mg IntraVENous Q12H    amiodarone (NEXTERONE) 360 mg in dextrose 200 mL (1.8 mg/mL) infusion  0.5-1 mg/min IntraVENous TITRATE    [Held by provider] furosemide (LASIX) injection 40 mg  40 mg IntraVENous BID    insulin lispro (HUMALOG) injection   SubCUTAneous Q6H    sodium chloride (NS) flush 5-40 mL  5-40 mL IntraVENous Q8H    famotidine (PF) (PEPCID) 20 mg in 0.9% sodium chloride 10 mL injection  20 mg IntraVENous Q12H    chlorhexidine (PERIDEX) 0.12 % mouthwash 10 mL  10 mL Oral Q12H    fentaNYL (PF) 1,500 mcg/30 mL (50 mcg/mL) infusion  0-200 mcg/hr IntraVENous TITRATE    midazolam in normal saline (VERSED) 1 mg/mL infusion  0-10 mg/hr IntraVENous TITRATE    heparin (porcine) 25,000 units in 0.45% saline 250 ml infusion  11.79-26 Units/kg/hr IntraVENous TITRATE    [Held by provider] milrinone (PRIMACOR) 20 MG/100 ML D5W infusion  0.375 mcg/kg/min IntraVENous CONTINUOUS    aspirin chewable tablet 81 mg  81 mg Per NG tube DAILY    atorvastatin (LIPITOR) tablet 40 mg  40 mg Per NG tube QHS    EPINEPHrine (ADRENALIN) 4 mg in 0.9% sodium chloride 250 mL infusion  1-10 mcg/min IntraVENous TITRATE    vasopressin (VASOSTRICT) 20 Units in 0.9% sodium chloride 100 mL infusion  0.03 Units/min IntraVENous CONTINUOUS    NOREPINephrine (LEVOPHED) 32,000 mcg in dextrose 5% 250 mL (128 mcg/mL) infusion  0.5-50 mcg/min IntraVENous TITRATE         Telemetry: [x]Sinus []A-flutter []Paced    []A-fib []Multiple PVCs                  Physical Exam:      General:  Intubated/sedated, but will open eyes and shake head to yes/no questions. NAD   Head:  Normocephalic, without obvious abnormality, atraumatic. Eyes:  Conjunctivae/corneas clear. PERRL,   Nose: Nares normal. Septum midline. Mucosa normal. No drainage or sinus tenderness. Throat: Lips, mucosa, and tongue normal. Teeth and gums normal.   Neck: Supple, symmetrical, trachea midline, no adenopathy, thyroid: no enlargment/tenderness/nodules, no carotid bruit and no JVD. Back:   Symmetric, no curvature. ROM normal.   Lungs:   Symmetrical chest rise, good AE bilat. Course. Intermittent rhonchi. No rales or wheezes noted. Chest wall:  No tenderness or deformity. Heart:  Regular rate and rhythm, S1, S2 normal, no murmur, click, rub or gallop. Abdomen:   Soft, non-tender. Bowel sounds normal. No masses,  No organomegaly. Extremities: Extremities normal, atraumatic, no cyanosis or edema. - restraints    Pulses: 2+ and symmetric all extremities.    Skin: Skin color, texture, turgor normal. No rashes or lesions   Lymph nodes:  Cervical, supraclavicular, and axillary nodes normal.   Neurologic: Grossly nonfocal         DATA:  MAR reviewed and pertinent medications noted or modified as needed    Labs:  Recent Labs     10/23/22  0334 10/22/22  0350 10/21/22  0400   WBC 9.2 10.5 12.5   HGB 8.7* 9.2* 9.7*   HCT 28.8* 30.6* 31.5*    230 247       Recent Labs     10/23/22  0334 10/22/22  1238 10/22/22  0350 10/21/22  1322 10/21/22  0400    140 140   < > 141   K 3.8 3.9 3.7   < > 3.8    103 103   < > 108   CO2 26 31 30   < > 27   * 143* 155*   < > 168*   BUN 20* 19* 20*   < > 24*   CREA 0.77 0.93 0.92   < > 1.19   CA 9.1 8.6 8.6   < > 8.0*   MG 2.3  --  2.2  --  2.4   PHOS 1.9* 2.3* 2.2*   < > 3.5   ALB 3.2* 3.1* 2.8*   < > 3.0*    < > = values in this interval not displayed. No results for input(s): PH, PCO2, PO2, HCO3, FIO2 in the last 72 hours. Recent Labs     10/23/22  0313 10/22/22  0319 10/21/22  0355   FIO2I 35 40 60   HCO3I 26.6* 30.4* 26.3*   PCO2I 41.8 57.0* 51.6*   PHI 7.41 7.34* 7.32*   PO2I 60* 120* 168*         Imaging:  [x]   I have personally reviewed the patients radiographs and reports  XR Results (most recent):  XR Results (most recent):  Results from Hospital Encounter encounter on 10/19/22    XR CHEST PORT    Narrative  EXAM: PORTABLE CHEST 0250 hours    CLINICAL HISTORY/INDICATION: intubated , acute hypoxic respiratory failure  requiring mechanical ventilation, congestive heart failure exacerbation,  cardiogenic shock, non-ST segment elevated myocardial infarction, acute on  chronic decompensation systolic heart failure    COMPARISON: Chest x-ray 10/22, 10/21, 10/20, 10/19/2022. TECHNIQUE: Single AP view    FINDINGS:    Endotracheal tube terminates 4.6 cm proximal to the onofre. The esophagogastric  tube tip terminates at the proximal stomach the sidehole at the distal  esophagus. Cardiac silhouette normal in size. Minimal blunting of the costophrenic angles. Continued bilateral infiltrates. Impression  No pneumothorax. No interval change with diffuse bilateral infiltrates.   Recommend advancement of the esophagogastric tube by 8 cm to ensure that the  sidehole is within the gastric lumen     CT Results (most recent):  Results from Hospital Encounter encounter on 10/19/22    CTA CHEST W OR W WO CONT    Narrative  EXAM: CT Angiogram of the Chest    CLINICAL INDICATION:  hypoxic    TECHNIQUE: CT angiogram of the chest performed following intravenous contrast  administration. MIP reconstructions were performed. All CT scans at this facility are performed using dose optimization technique as  appropriate to a performed exam, to include automated exposure control,  adjustment of the mA and/or kV according to patient size (including appropriate  matching for site specific examination) or use of iterative reconstruction  technique. COMPARISON: Radiograph 2 hours prior    FINDINGS:  Limitations: Technically adequate for evaluation of proximal vessels. Pulmonary Arteries: No evidence of central, lobar or proximal segmental  pulmonary embolus. More distal branches are somewhat evaluated due to  respiratory motion and pulmonary consolidation. Aorta: No evidence of aortic aneurysm. Heart: Mild enlargement of the left ventricle with possible apical thinning. Pericardium: No pericardial effusion. Lungs: Deep tendon consolidation in bilateral lower lungs, with bilateral upper  lung groundglass opacities with relative subpleural sparing. Pleura: Moderate right, small left pleural effusion. Trachea and Bronchi: Endotracheal tube 3.4 cm above the onofre. Lower neck: Dense contrast pooling within the left subclavian vein. Left level 6  lymph node measuring up to 1.6 x 1.1 cm. Axilla/Chest wall: Small bilateral axillary lymph nodes, without pathologic  features. Estil Bosch Upper Abdomen: Large cyst right kidney measuring 5.2 cm, 2.5 cm in the left  kidney. Cholecystectomy. Gastric tube terminating within the gastric body. Mild  reflux of contrast into the IVC. Musculoskeletal: No acute osseous findings. Impression  1. No CT evidence of pulmonary embolus. 2.  Diffuse bilateral pulmonary alveolar opacities with dependent consolidation. Overall appearance is suggestive of diffuse alveolar dysfunction, including  hydrostatic pulmonary edema, alveolar hemorrhage, RDS.  Bilateral pleural  effusions make pulmonary edema most likely. IMPRESSION:   Acute Hypoxic Respiratory Failure - Requiring mechanical ventilation likely 2/2 CHF exacerbation with Cardiogenic shock on multiple vasopressor. Cardiogenic Shock - In the setting of acute HF and NSTEMI - on levo, vaso, epi with stable hemodynamics. Tachyarrhythmia-unable to determine underlying rhythm. Sinus tach with PVCs vs Afib RVR vs NSVT. Added amiodarone, which resolved arrhythmia, therefore suspect underlying rhythm is A. fib RVR as patient does have history of. Acute on chronic decompensated systolic heart failure - Echo 7/22 EF 20%  NSTEMI - initial troponin 5K, on heparin gtt  Bacteremia? - BxCx(10/20) growing GPC in aerobic bottle. Suspect contaminant? Afebrile, Aleukocytosis. Will repeat check LA and PCT. 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  Hx of CHF - EF 20% 8/22  Hx of BPH  Unspecified psych disorder - previously on Risperidone, Paxil, Geodon     Patient Active Problem List   Diagnosis Code    Elevated prostate specific antigen (PSA) R97.20    Spermatocele of epididymis N43.40    BPH with obstruction/lower urinary tract symptoms N40.1, N13.8    Respiratory failure with hypoxia (HCC) J96.91        RECOMMENDATIONS:   Neuro: Con't Fentanyl and Versed gtt for sedation. RASS 0 to -1. PRN Fentanyl / Versed for breakthrough sedation. Pulm: Titrate FiO2 for SpO2 >90%. Optimize bronchial hygiene. Daily CXR and ABG while intubated. CVS: Con't vasopressors -- Levophed gtt, shock dose Vaso and Epi gtt. Currently, pt is hemodynamically stable with SBP in the 110s on Cathy with current pressor support. [place] positional. Wean as able. Con't Amio gtt. Goal MAP >65mmHg. S/p Milrinone gtt and Lasix gtt (worsening hypotension). Con't daily ASA. Echo (10/20) EF 25-30%.  Cardiology following -- recommends Comfort care. No further interventions recommended as pt has no viability along anterior wall by MRI at [facility]. Pt therefore also not a candidate for aortic balloon pump or Impella device given that he is not a transplant candidate. Con't Heparin gtt. Monitor  for hemodynamic trend. Give Bumex IVP today. GI: NPO. SUP(pepcid)  Renal: Trend Cr, UOP. Real. Strict I/Os  Hem/Onc: Trend H/H, monitor for s/o active bleeding. Daily CBC. I/D:Trend WBCs and temperature curve. Sputum Cx (10/20) now growing e. Coli. Noted (+) BxCx growing GPC --> likely contaminant. F/up repeat BxCx, Con't Vanc/Zosyn for now, deescalate ABX when cx's finalize. Metabolic: Daily BMP, mag, phos. Trend lytes and replace per protocol. Endocrine:Q6 glucoses. SSI. Avoid hypoglycemia. Musc/Skin: wound care prn  Partial Code  Discussed in interdisciplinary rounds     Best Practices/Safety Bundles:  Sepsis Bundle per Hospital Protocol  Glycemic control; avoid Hypoglycemia  IHI ICU Bundles:   Central Line Bundle Followed , Real Bundle Followed, and Vent Bundle Followed, Vent Day 3    [facility] Vent patients/ Pulmonary pts:   VAP bundle, Aim to keep peak plateau pressure 57-40YM H2O  Aspiration Precautions - HOB >30'  Daily sedation holiday as indicated  SBT as tolerated/appropriate  Stress Ulcer prophylaxis: Pepcid   DVT prophylaxis: Heparin gtt  Need for Lines, real assessed. Restraints need. Not required. D/C'd today (10/22).            DAVID time 20 mins    Marva Mayberry PA-C   10/23/22  Pulmonary, Critical Care Medicine  [facility]

## 2022-10-23 NOTE — PROGRESS NOTES
0730am Bedside shift report received from Moni Breen, ANNALISE  0900am Ok to increase titration of Heparin to keep PTT therapeutic as per Ms Jean Baptiste [place], [place].  [id number] Notify Phlebotomy r/t unable to get blood work via [address]. Pt is a lab draw. Fio2-increased to 55.   1104am A-line positional. Armboard applied. 1128am- Bumex IVP 1mg given as per MD  1430pm Unable to draw lab-PTT via A-line. Talked to Jimmy Awad,  r/t clarification that pt is lab draw not RN draw. 1800pm Daughter-Ms. Monae added to list as a contact. 2300pm Bedside shift change report given to Maurisio Mitchell RN by Velia Covington RN Report included the following information SBAR, Kardex, Intake/Output, Recent Results, Med Rec Status, Cardiac Rhythm A-fib, and Alarm Parameters .

## 2022-10-23 NOTE — PROGRESS NOTES
[facility] - Progress Note  Admit Date: 10/19/2022    Assessment:      -Acute hypoxic respiratory failure, requiring intubation and mechanical ventilation.    -Cardiogenic shock, on pressor support. -A/c HFrEF/ICMY. CXR with pulmonary edema. LVEF 20-30%  -NSTEMI, troponin peaked at 8773.   -3V CAD. S/p LHC in setting of late presentation MI s/p IABP. Not a candidate for CABG/PCI at [facility] cardiology. No viability by cardiac MRI at [facility] along anterior wall. Cath findings as noted below:   Left main coronary artery: Short, patent   Left anterior descending coronary artery: 50% ostial; 99% mid   BRAULIO 1 flow to mid/distal LAD with right to left collaterals 70% diagonal 1   Left circumflex coronary artery: Non-dominant, 100% proximal   Distal fed by right to left collaterals   Right coronary artery:   Dominant, diffuse 80% proximal to mid 70% at crux into posterior descending artery   Short posterolateral branch   Collaterals to left circumflex and left anterior descending    Cardiac MRI  Enlarged LV with severely decreased function. LVEF of 23.9%. No viability in the proximal mid to distal LAD and and basal to apical circumflex territory. 4. Approximately 80% viability in the RCA territory. -PAF, on amiodarone and Eliquis as outpatient   -HTN, on Entresto, aldactone, lasix and lopressor as outpatient. -HLD  -DM  -H/o LLE DVT. -Tobacco abuse   -Partial code. Primary cardiologist is at [facility]. Plan:     Patient has extensive coronary artery disease with no viable options. Continued decompensation is expected. Ongoing supportive care. Partial code noted. Discussed with ICU team.  Agree with gentle diuresis for today. Continue amiodarone for nonsustained VT and A. fib overnight. Subjective:     Remains on pressors. Awake on ventilator  Paroxysmal A. fib and nonsustained VT noted overnight started amiodarone.   Improved    Objective:      Patient Vitals for the past 8 hrs: Temp Pulse Resp BP SpO2   10/23/22 0900 (!) 96.6 °F (35.9 °C) 91 17 (!) 131/96 98 %   10/23/22 0830 (!) 96.6 °F (35.9 °C) 89 21 126/84 98 %   10/23/22 0800 97.2 °F (36.2 °C) 75 21 122/79 98 %   10/23/22 0730 97.3 °F (36.3 °C) 78 17 123/75 98 %   10/23/22 0700 97.5 °F (36.4 °C) 90 16 120/84 98 %   10/23/22 0630 97.9 °F (36.6 °C) 88 15 123/78 98 %   10/23/22 0600 98.4 °F (36.9 °C) 82 17 121/85 98 %   10/23/22 0530 98.6 °F (37 °C) 80 15 120/82 99 %   10/23/22 0500 99.1 °F (37.3 °C) 84 17 122/78 98 %   10/23/22 0430 99.7 °F (37.6 °C) 86 17 126/80 98 %   10/23/22 0400 99.9 °F (37.7 °C) (!) 115 19 127/76 98 %   10/23/22 0330 100.2 °F (37.9 °C) 97 18 122/79 97 %   10/23/22 0315 -- 94 19 -- 96 %   10/23/22 0300 99.5 °F (37.5 °C) 90 17 116/76 95 %   10/23/22 0230 99.5 °F (37.5 °C) 93 20 119/79 94 %   10/23/22 0200 99.3 °F (37.4 °C) 97 19 118/70 94 %         Patient Vitals for the past 96 hrs:   Weight   10/22/22 0500 81.6 kg (179 lb 14.3 oz)   10/21/22 0400 82.6 kg (182 lb 1.6 oz)   10/20/22 1414 81.6 kg (180 lb)   10/20/22 0936 81.6 kg (180 lb)   10/20/22 0552 81.7 kg (180 lb 1.9 oz)   10/19/22 2128 84.8 kg (187 lb)                    Intake/Output Summary (Last 24 hours) at 10/23/2022 0956  Last data filed at 10/23/2022 0915  Gross per 24 hour   Intake 2852.37 ml   Output 990 ml   Net 1862.37 ml       Physical Exam:  General:  intubated  Neck:  nontender  Lungs:  decreased  Heart: tachy S1, S2 normal, no murmur, click, rub or gallop  Abdomen:  abdomen is soft without significant tenderness, masses, organomegaly or guarding  Extremities:  extremities normal, atraumatic, no cyanosis or edema    Data Review:     Labs: Results:       Chemistry Recent Labs     10/23/22  0334 10/22/22  1238 10/22/22  0350 10/21/22  1322 10/21/22  0400   * 143* 155*   < > 168*    140 140   < > 141   K 3.8 3.9 3.7   < > 3.8    103 103   < > 108   CO2 26 31 30   < > 27   BUN 20* 19* 20*   < > 24*   CREA 0.77 0.93 0.92   < > 1.19 CA 9.1 8.6 8.6   < > 8.0*   MG 2.3  --  2.2  --  2.4   PHOS 1.9* 2.3* 2.2*   < > 3.5   AGAP 10 6 7   < > 6   BUCR 26* 20 22*   < > 20   ALB 3.2* 3.1* 2.8*   < > 3.0*    < > = values in this interval not displayed.       CBC w/Diff Recent Labs     10/23/22  0334 10/22/22  0350 10/21/22  0400   WBC 9.2 10.5 12.5   RBC 3.19* 3.34* 3.50*   HGB 8.7* 9.2* 9.7*   HCT 28.8* 30.6* 31.5*    230 247   GRANS 83* 73 66   LYMPH 10* 18* 24   EOS 0 1 0      Cardiac Enzymes No results found for: CPK, CK, CKMMB, CKMB, RCK3, CKMBT, CKNDX, CKND1, LAURA, TROPT, TROIQ, LISA, TROPT, TNIPOC, BNP, BNPP   Coagulation Recent Labs     10/23/22  0334 10/22/22  1238   APTT 65.7* 79.5*       Lipid Panel No results found for: CHOL, CHOLPOCT, CHOLX, CHLST, CHOLV, 794679, HDL, HDLP, LDL, LDLC, DLDLP, 059720, VLDLC, VLDL, TGLX, TRIGL, TRIGP, TGLPOCT, CHHD, CHHDX   BNP No results found for: BNP, BNPP, XBNPT   Liver Enzymes Recent Labs     10/23/22  0334   ALB 3.2*      Digoxin    Thyroid Studies Lab Results   Component Value Date/Time    TSH 1.06 10/20/2022 05:15 AM          Signed By: Yoselin Duval MD     October 23, 2022

## 2022-10-23 NOTE — PROGRESS NOTES
Problem: Ventilator Management  Goal: *Adequate oxygenation and ventilation  Outcome: Progressing Towards Goal  Goal: *Patient maintains clear airway/free of aspiration  Outcome: Progressing Towards Goal  Goal: *Absence of infection signs and symptoms  Outcome: Progressing Towards Goal  Goal: *Normal spontaneous ventilation  Outcome: Progressing Towards Goal     Problem: Patient Education: Go to Patient Education Activity  Goal: Patient/Family Education  Outcome: Progressing Towards Goal     Problem: Pressure Injury - Risk of  Goal: *Prevention of pressure injury  Description: Document John Scale and appropriate interventions in the flowsheet. Outcome: Progressing Towards Goal  Note: Pressure Injury Interventions:  Sensory Interventions: Assess changes in LOC, Float heels, Keep linens dry and wrinkle-free, Minimize linen layers    Moisture Interventions: Absorbent underpads, Internal/External urinary devices    Activity Interventions: Pressure redistribution bed/mattress(bed type)    Mobility Interventions: Float heels, HOB 30 degrees or less    Nutrition Interventions: Discuss nutritional consult with provider    Friction and Shear Interventions: HOB 30 degrees or less, Lift sheet, Minimize layers, Lift team/patient mobility team                Problem: Patient Education: Go to Patient Education Activity  Goal: Patient/Family Education  Outcome: Progressing Towards Goal     Problem: Non-Violent Restraints  Goal: Removal from restraints as soon as assessed to be safe  Outcome: Progressing Towards Goal  Goal: No harm/injury to patient while restraints in use  Outcome: Progressing Towards Goal  Goal: Patient's dignity will be maintained  Outcome: Progressing Towards Goal  Goal: Patient Interventions  Outcome: Progressing Towards Goal     Problem: Falls - Risk of  Goal: *Absence of Falls  Description: Document Emanuel Gamma Fall Risk and appropriate interventions in the flowsheet.   Outcome: Progressing Towards Goal  Note: Fall Risk Interventions:       Mentation Interventions: Adequate sleep, hydration, pain control, Bed/chair exit alarm, Door open when patient unattended    Medication Interventions: Bed/chair exit alarm    Elimination Interventions: Bed/chair exit alarm, Call light in reach              Problem: Patient Education: Go to Patient Education Activity  Goal: Patient/Family Education  Outcome: Progressing Towards Goal     Problem: Patient Education: Go to Patient Education Activity  Goal: Patient/Family Education  Outcome: Progressing Towards Goal

## 2022-10-23 NOTE — PROGRESS NOTES
[facility] UPDATE:    12:30: Patient's MPOA (and Sister) Damon Anderson currently at bedside. Give Ms. Harkins medical update regarding patient's current critical care status. Discussed that patient is currently still heavily dependent on several vasopressors for life support. Discussed how patient has not made much improvement in the last several days despite all aggressive measures. Discussed how patient may be developing a pneumonia as evidenced by sputum culture and pt has been started on ABX. Discussed that at this time, Cardiology has no further recommendations for patient's cardiogenic shock as he is not a candidate for transplant and therefore also not a candidate for balloon pump or Impella device. Advised Ms. Jared Cortes that Cardiology has recommended comfort care/hospice at this time. Given patient's current critical status and current dependence on several forms of life support, I also recommended that patient be transitioned to comfort care to alleviate any further suffering as we are unable to reverse his end-stage cardiac disease. Ms. Jared Cortes was pleasant to converse with and stated understanding of the conversation. She also referenced her conversation with palliative care team on Friday, which also discussed goals of care. We confirmed that patient is already a partial code. I recommended the patient be made a full DNR/DNI, which would include no reintubation if the current ET tube were to become dislodged, no CPR, no ACLS medication in the event of cardiac arrest.  Ms. Jared Cortes agreed. Patient's daughter Cindy Mccracken ([phone]) also at bedside and present for conversation, and also agrees with changing CODE STATUS to full DNR/DNI should he sustain a cardiac arrest.  Mrs. Jared Cortes stated that she was waiting on a few family members to visit the patient and that she would consider transition to comfort care on Monday 10/24. Any and all questions were answered to the best my ability.     Plan:  - Continue current aggressive care  - Change CODE STATUS to DNR/DNI  - We will have palliative care team touch base with patient's family tomorrow (10/24) to further discuss [address] and possible transition to comfort care with compassionate extubation.  - Need to obtain paperwork stating that Ms. Josselin Salazar is the patient's legal medical power of     - Patient's daughter would like to be updated with any medical update as well as, she is the legal next of kin.   [facility] ([phone])        Isha Bonilla PA-C  10/23/22    Pulmonary [address] [facility],[address] [facility]

## 2022-10-24 PROBLEM — Z71.89 GOALS OF CARE, COUNSELING/DISCUSSION: Status: ACTIVE | Noted: 2022-01-01

## 2022-10-24 PROBLEM — Z51.5 ENCOUNTER FOR PALLIATIVE CARE: Status: ACTIVE | Noted: 2022-01-01

## 2022-10-24 PROBLEM — I50.9 CONGESTIVE HEART FAILURE (HCC): Status: ACTIVE | Noted: 2022-01-01

## 2022-10-24 NOTE — PROGRESS NOTES
Comprehensive Nutrition Assessment    Type and Reason for Visit: Reassess, NPO/clear liquid    Nutrition Recommendations/Plan: To continue NPO status today, pressor requirements have decreased. Abdomen distended, no BM since PTA, to repeat KUB. Will re-address tomorrow based on clinical status/KUB results. If to initiate tube feeds: rec Vital HP @ 10 ml/hr + FWF 30 ml q 4 hours  Plan to add thiamine supplementation 200 mg x 7 days d/t risk for refeeding. Continue to monitor readiness of EN, weight, labs, and plan of care during admission. Malnutrition Assessment:  Malnutrition Status: At risk for malnutrition (specify) (NPO, vent) (10/21/22 1213)    Context:  Acute illness       Nutrition Assessment:    Admitted with SOB. While in the ED, pt became profoundly hypoxic was subsequently intubated 10/20 - likely 2/2 CHF exacerbation. +OGT 10/20. Transfer to [facility] for initiation of ECMO, IABP, etc was declined. Discussed care during interdisciplinary rounds. Per RN/PA, pt abdomen is distended, currently w/ OGT to suction. To repeat KUB. Pressor requirements decreasing, to hold epi, possible vaso and levo down to 7 mcg/min. Nutrition Related Findings:    Abd distended per flowsheet. No recorded BM since PTA. UOP: 1225 ml. Pertinent Meds: lipitor, abx, K phos 10 mmol (completed 10/23), amiodarone gtt, epi @ 2 mcg/min, fentanyl gtt, heparin gtt, versed gtt, levo @ 7 mcg/min, vaso @ 0.03 units/min   Pertinent Labs: Na 141 wnl, K 3.6 wnl, Phos 2.2 L Wound Type: None    Current Nutrition Intake & Therapies:  Average Meal Intake: NPO     DIET NPO    Anthropometric Measures:  Height: 5' 7\" (170.2 cm)  Ideal Body Weight (IBW): 148 lbs (67 kg)  Admission Body Weight: 179 lb 14.3 oz (81.6 kg)  Current Body Wt:  82.6 kg (182 lb 1.6 oz)  123 % IBW. Current BMI (kg/m2): 28.5  BMI Category: Overweight (BMI 25.0-29. 9)    Estimated Daily Nutrient Needs:  Energy Requirements Based On: Formula  Weight Used for Energy Requirements: Admission  Energy (kcal/day): 8910-5206 ([place] x 0.9-1.1)  Weight Used for Protein Requirements: Admission  Protein (g/day):  (1.2-2 g/day)  Method Used for Fluid Requirements: 1 ml/kcal  Fluid (ml/day): 6399-9665    Nutrition Diagnosis:   Swallowing difficulty related to impaired respiratory function as evidenced by NPO or clear liquid status due to medical condition, intubation  Inadequate protein-energy intake related to acute injury/trauma as evidenced by NPO or clear liquid status due to medical condition    Nutrition Interventions:   Food and/or Nutrient Delivery: Continue NPO     Coordination of Nutrition Care: Interdisciplinary rounds  Plan of Care discussed with: interdisciplinary team    Goals:     Goals: Meet at least 75% of estimated needs, by next RD assessment       Nutrition Monitoring and Evaluation:         Physical Signs/Symptoms Outcomes: Biochemical data, Hemodynamic status, Weight, GI status, Fluid status or edema    Discharge Planning:     Too soon to determine    Marielos Padilla [address], [address]   Contact: [phone]

## 2022-10-24 NOTE — DIABETES MGMT
Glycemic Control:  Pt with no history of Diabetes. Pt's blood glucose readings are in the target range. Recent Glucose Results:   Lab Results   Component Value Date/Time     (H) 10/24/2022 05:31 AM     (H) 10/23/2022 11:33 PM     (H) 10/23/2022 11:00 AM    GLUCPOC 126 (H) 10/24/2022 05:43 AM    GLUCPOC 129 (H) 10/24/2022 12:02 AM    GLUCPOC 149 (H) 10/23/2022 05:27 PM     Lab Results   Component Value Date/Time    Hemoglobin A1c 5.6 10/22/2022 03:50 AM     Continue to monitor for glycemic control.   Marisol Edmondson MS, RD CDE

## 2022-10-24 NOTE — PROGRESS NOTES
[facility]. Pulmonary, Critical Care, and Sleep Medicine    Name: Rena Cm MRN: [id number]   : 1955 Hospital: [address] [place] [address] Dr   Date: 10/24/2022  Admission Date: 10/19/2022     Chart and notes reviewed. Data reviewed. I have evaluated all findings. [x]I have reviewed the flowsheet and previous days notes. [x]The patient is unable to give any meaningful history or review of systems because the patient is:  [x]Intubated [x]Sedated   []Unresponsive      [x]The patient is critically ill on      [x]Mechanical ventilation [x]Pressors   []BiPAP []         Interval HPI: 79year old with a past medical history of severe CHF (EF 20%), 3 vessel CAD, DVT, A fib, admitted to the unit with acute hypoxic respiratory failure and cardiogenic shock likely 2/2 acute on chronic CHF exacerbation and NSTEMI (on heparin gtt). Pt is s/p lasix gtt. Milrinone was stopped 2/2 worsening hypotension. Transfer to [facility] for initiation of ECMO, IABP, etc was declined. Course complicated by Afib RVR in which Amiodarone gtt started. Subjective 10/24/22  Hospital Day: 4    Vent Day: Intubated 10/20     - Patient remains on multiple vasopressors (epi, Levophed, vaso), will wean off Epi gtt today. Epi gtt only infusing at 2 mcg/min   - Remains on Amiodarone gtt   - Patient awake, but not tracking. Following simple commands, very weak. Remains on fentanyl and Versed  -  cc overnight  -Abd remains distended with bile colored output, will obtain KUB                ROS:Review of systems not obtained due to patient factors. Events and notes from last 24 hours reviewed.      Patient Active Problem List   Diagnosis Code    Elevated prostate specific antigen (PSA) R97.20    Spermatocele of epididymis N43.40    BPH with obstruction/lower urinary tract symptoms N40.1, N13.8    Respiratory failure with hypoxia (HCC) J96.91       Vital Signs:  Visit Vitals  /66   Pulse 69   Temp 98.6 °F (37 °C)   Resp 13   Ht 5' 7\" (1.702 m)   Wt 81.6 kg (179 lb 14.3 oz)   SpO2 100%   BMI 28.18 kg/m²       O2 Device: Endotracheal tube, Ventilator   O2 Flow Rate (L/min): 40 l/min   Temp (24hrs), Av.1 °F (36.7 °C), Min:97.1 °F (36.2 °C), Max:98.7 °F (37.1 °C)       Intake/Output:   Last shift:      10/24 0701 - 10/24 1900  In: -   Out: 45 [Urine:45]  Last 3 shifts: 10/22 1901 - 10/24 0700  In: 4661.2 [I.V.:4661.2]  Out: 5 [Urine:1725]    Intake/Output Summary (Last 24 hours) at 10/24/2022 1402  Last data filed at 10/24/2022 0829  Gross per 24 hour   Intake 1808.79 ml   Output 680 ml   Net 1128.79 ml          Current Facility-Administered Medications   Medication Dose Route Frequency    sodium phosphate 9 mmol in 0.9% sodium chloride 250 mL infusion  9 mmol IntraVENous ONCE    piperacillin-tazobactam (ZOSYN) 3.375 g in 0.9% sodium chloride (MBP/ADV) 100 mL MBP  3.375 g IntraVENous Q8H    amiodarone (NEXTERONE) 360 mg in dextrose 200 mL (1.8 mg/mL) infusion  0.5-1 mg/min IntraVENous TITRATE    [Held by provider] furosemide (LASIX) injection 40 mg  40 mg IntraVENous BID    insulin lispro (HUMALOG) injection   SubCUTAneous Q6H    sodium chloride (NS) flush 5-40 mL  5-40 mL IntraVENous Q8H    famotidine (PF) (PEPCID) 20 mg in 0.9% sodium chloride 10 mL injection  20 mg IntraVENous Q12H    chlorhexidine (PERIDEX) 0.12 % mouthwash 10 mL  10 mL Oral Q12H    fentaNYL (PF) 1,500 mcg/30 mL (50 mcg/mL) infusion  0-200 mcg/hr IntraVENous TITRATE    midazolam in normal saline (VERSED) 1 mg/mL infusion  0-10 mg/hr IntraVENous TITRATE    heparin (porcine) 25,000 units in 0.45% saline 250 ml infusion  11.79-27 Units/kg/hr IntraVENous TITRATE    aspirin chewable tablet 81 mg  81 mg Per NG tube DAILY    atorvastatin (LIPITOR) tablet 40 mg  40 mg Per NG tube QHS    vasopressin (VASOSTRICT) 20 Units in 0.9% sodium chloride 100 mL infusion  0.03 Units/min IntraVENous CONTINUOUS    NOREPINephrine (LEVOPHED) 32,000 mcg in dextrose 5% 250 mL (128 mcg/mL) infusion  0.5-50 mcg/min IntraVENous TITRATE         Telemetry: []Sinus []A-flutter []Paced    [x]A-fib []Multiple PVCs                  Physical Exam:      General:  Intubated/sedated, but will open eyes and follows simple commands. NAD   Head:  Normocephalic, without obvious abnormality, atraumatic. Eyes:  Conjunctivae/corneas clear. PERRL,   Nose: Nares normal. Septum midline. Mucosa normal. No drainage or sinus tenderness. Throat: Lips, mucosa, and tongue normal. Teeth and gums normal.   Neck: Supple, symmetrical, trachea midline, no adenopathy, thyroid: no enlargment/tenderness/nodules, no carotid bruit and no JVD. Back:   Symmetric, no curvature. Lungs:   Symmetrical chest rise, good AE bilat. Course. Intermittent rhonchi. No rales or wheezes noted. Chest wall:  No tenderness or deformity. Heart:  irregular rate and rhythm, S1, S2 normal, no murmur, click, rub or gallop. Abdomen:   Abd distention with OGT to LIWS, Bowel sounds hypoactive. No masses,  No organomegaly. Extremities: Extremities normal, atraumatic, no cyanosis or edema. + restraints    Pulses: 2+ and symmetric all extremities.    Skin: Skin color, texture, turgor normal. No rashes or lesions   Lymph nodes:  Cervical, supraclavicular, and axillary nodes normal.   Neurologic: Grossly nonfocal, sedative          DATA:  MAR reviewed and pertinent medications noted or modified as needed    Labs:  Recent Labs     10/24/22  0531 10/23/22  0334 10/22/22  0350   WBC 6.3 9.2 10.5   HGB 8.8* 8.7* 9.2*   HCT 28.7* 28.8* 30.6*    214 230     Recent Labs     10/24/22  0531 10/23/22  2333 10/23/22  1100 10/23/22  0334 10/22/22  1238 10/22/22  0350    141 140 140   < > 140   K 3.6 3.9 3.7 3.8   < > 3.7    106 107 104   < > 103   CO2 29 29 27 26   < > 30   * 140* 158* 150*   < > 155*   BUN 22* 21* 20* 20*   < > 20*   CREA 0.71 0.84 0.70 0.77   < > 0.92   CA 9.2 8.7 8.7 9.1   < > 8.6   MG 2.3  --   -- 2.3  --  2.2   PHOS 2.2* 2.5 2.3* 1.9*   < > 2.2*   ALB 3.0* 3.0* 3.0* 3.2*   < > 2.8*    < > = values in this interval not displayed. No results for input(s): PH, PCO2, PO2, HCO3, FIO2 in the last 72 hours. Recent Labs     10/24/22  0320 10/23/22  0313 10/22/22  0319   FIO2I 55 35 40   HCO3I 28.7* 26.6* 30.4*   PCO2I 49.4* 41.8 57.0*   PHI 7.37 7.41 7.34*   PO2I 91 60* 120*       Imaging:  [x]   I have personally reviewed the patients radiographs and reports  XR Results (most recent):  XR Results (most recent):  Results from Hospital Encounter encounter on 10/19/22    XR CHEST PORT    Narrative  HISTORY: Respiratory distress. Follow-up imaging. Sajan Jaelyn EXAM: Chest.    TECHNIQUE: Single view AP portable semiupright chest.    COMPARISON: 10/23/2022    FINDINGS: Minimally worsened aeration of bilateral hemithoraces is demonstrated  with persistent moderate bilateral diffuse interstitial prominence and  associated perihilar and bibasilar opacities. No pneumothorax or pleural  effusions. Endotracheal tube tip is projecting approximately 3.6 cm above the onofre. Unchanged position of nasogastric tube. Heart and mediastinal structures are unchanged. Heart is enlarged. . Visualized  bony thorax and soft tissues are within normal limits. Impression  IMPRESSION:    1. Minimally worsened sequela of moderate congestion. Follow-up with plain  imaging of the chest.  2. Tubes and catheters as above. CT Results (most recent):  Results from Hospital Encounter encounter on 10/19/22    CTA CHEST W OR W WO CONT    Narrative  EXAM: CT Angiogram of the Chest    CLINICAL INDICATION:  hypoxic    TECHNIQUE: CT angiogram of the chest performed following intravenous contrast  administration. MIP reconstructions were performed.     All CT scans at this facility are performed using dose optimization technique as  appropriate to a performed exam, to include automated exposure control,  adjustment of the mA and/or kV according to patient size (including appropriate  matching for site specific examination) or use of iterative reconstruction  technique. COMPARISON: Radiograph 2 hours prior    FINDINGS:  Limitations: Technically adequate for evaluation of proximal vessels. Pulmonary Arteries: No evidence of central, lobar or proximal segmental  pulmonary embolus. More distal branches are somewhat evaluated due to  respiratory motion and pulmonary consolidation. Aorta: No evidence of aortic aneurysm. Heart: Mild enlargement of the left ventricle with possible apical thinning. Pericardium: No pericardial effusion. Lungs: Deep tendon consolidation in bilateral lower lungs, with bilateral upper  lung groundglass opacities with relative subpleural sparing. Pleura: Moderate right, small left pleural effusion. Trachea and Bronchi: Endotracheal tube 3.4 cm above the oonfre. Lower neck: Dense contrast pooling within the left subclavian vein. Left level 6  lymph node measuring up to 1.6 x 1.1 cm. Axilla/Chest wall: Small bilateral axillary lymph nodes, without pathologic  features. Cloteal Bhat Upper Abdomen: Large cyst right kidney measuring 5.2 cm, 2.5 cm in the left  kidney. Cholecystectomy. Gastric tube terminating within the gastric body. Mild  reflux of contrast into the IVC. Musculoskeletal: No acute osseous findings. Impression  1. No CT evidence of pulmonary embolus. 2.  Diffuse bilateral pulmonary alveolar opacities with dependent consolidation. Overall appearance is suggestive of diffuse alveolar dysfunction, including  hydrostatic pulmonary edema, alveolar hemorrhage, RDS. Bilateral pleural  effusions make pulmonary edema most likely. IMPRESSION:   Acute Hypoxic Respiratory Failure - Requiring mechanical ventilation likely 2/2 CHF exacerbation with Cardiogenic shock on multiple vasopressor.    Cardiogenic Shock - In the setting of acute HF and NSTEMI - on levo, vaso, epi with stable hemodynamics. Tachyarrhythmia-unable to determine underlying rhythm. Sinus tach with PVCs vs Afib RVR vs NSVT. On Amiodarone, which resolved arrhythmia, therefore suspect underlying rhythm is A. fib RVR as patient does have history of. 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 RASS 0 to -1. PRN Fentanyl / Versed for breakthrough sedation. Attempt to wean off Versed. Can utilize Propofol gtt for sedation id additional sedation is needed. Pulm: Titrate FiO2 for SpO2 >90%. Optimize bronchial hygiene. Daily CXR and ABG while intubated. CVS: Con't vasopressors -- Levophed gtt and shock dose Vaso. D/c Epi gtt. Will replace arterial line. Con't Amio gtt. Goal MAP >65mmHg. S/p Milrinone gtt and Lasix gtt (worsening hypotension). Hold off on diureses for now. Con't daily ASA and statin. Echo (10/20) EF 25-30%. Cardiology following -- recommends Comfort care. No further interventions recommended as pt has no viability along anterior wall by MRI at [facility]. Pt therefore also not a candidate for aortic balloon pump or Impella device given that he is not a transplant candidate. Con't Heparin gtt. Monitor  for hemodynamic trend. GI: NPO.  Keep OGT to LIWS and obtain KUB. Abd remains distended. SUP(pepcid)  Renal: Trend Cr, UOP. Real. Strict I/Os  Hem/Onc: Trend H/H, monitor for s/o active bleeding. Daily CBC. I/D:Trend WBCs and temperature curve. Sputum Cx (10/20) now growing e. Coli. Repeat BxCx NGTD, Con't Zosyn, D/c Vanco.    Metabolic: Daily BMP, mag, phos. Trend lytes and replace per protocol. Replace potassium and phos   Endocrine:Q6 glucoses. SSI. Avoid hypoglycemia. Musc/Skin: wound care prn  DNR  Discussed in interdisciplinary rounds     Best Practices/Safety Bundles:  Sepsis Bundle per Hospital Protocol  Glycemic control; avoid Hypoglycemia  IHI ICU Bundles:   Central Line Bundle Followed , Real Bundle Followed, and Vent Bundle Followed, Vent Day 10/20  [facility] Vent patients/ Pulmonary pts:   VAP bundle, Aim to keep peak plateau pressure 02-34YP H2O  Aspiration Precautions - HOB >30'  Daily sedation holiday as indicated  SBT as tolerated/appropriate  Stress Ulcer prophylaxis: Pepcid   DVT prophylaxis: Heparin gtt  Need for Lines, real assessed. Attending Non-violent Restraint Reevaluation     I have reevaluated the patient one hour after initiation of intervention. The patient is comfortable, uninjured, but continues to pose an imminent risk of injury to self to themselves and/or serious disruption of medical treatment required to keep patient stable. The patient's current medical and behavioral conditions that warrant the use intervention include danger to self and Interference with medical equipment or treatment. Restraint or seclusion will be discontinued at the earliest possible time, regardless of the scheduled expiration of the order.     Based on my evaluation, restraints will be continued: YES             DAVID time 15 mins  MATEO Dudley  10/24/22  Pulmonary, Critical Care Medicine  [facility]

## 2022-10-24 NOTE — PROGRESS NOTES
attended the interdisciplinary rounds for Tori Denson, who is a 79 y.o.,male. Patients Primary Language is: [place]. According to the patients EMR Episcopalian Affiliation is: No Pentecostalism. The reason the Patient came to the hospital is:   Patient Active Problem List    Diagnosis Date Noted    Respiratory failure with hypoxia ([facility] [address] 10/20/2022    Elevated prostate specific antigen (PSA) 04/20/2015    Spermatocele of epididymis 04/20/2015    BPH with obstruction/lower urinary tract symptoms 04/20/2015      Plan:  Chaplains will continue to follow and will provide pastoral care on an as needed/requested basis.  recommends bedside caregivers page  on duty if patient shows signs of acute spiritual or emotional distress.     [address] [facility]  Board Certified [facility]   [phone]

## 2022-10-24 NOTE — INTERDISCIPLINARY ROUNDS
[facility]  Pulmonary, Critical Care, and Sleep Medicine  Interdisciplinary and Ventilator Weaning Rounds    Patient discussed in morning walking rounds and interdisciplinary rounds. ICU day: admitted 10/20    Overnight events:   No acute events overnight    Assessments and best practice:  Ventilator  Ventilator day 10/20  Vent settings: FIO2 55% PEEP 10   VAP bundle, aim to keep peak plateau pressure 84-85YD H2O  Weaning assessed and documented   Patient does not meet criteria for SBT. Patient is not on sedation holiday. Plan to wean with PS na. Outcome: Remain on full support    Final plan: Remain on mechanical ventilator, wean FIO2   Sedation  Fentanyl  and Versed  Other pertinent drips  Heparin, levophed, vasopressin, Versed, Fentanyl, Amiodarone   Lines noted  Left Radial Arterial Line , L Fem CVL  Critical labs assessed  Yes  Antibiotics  Zosyn   Medications reviewed  Yes  Pending imaging  CXR and KUB due to abd distention   Pending send out labs  No  Pending Procedures  Replace arterial line   Glycemic control  SSI   Stress ulcer prophylaxis. Pepcid  DVT prophylaxis. Heparin gtt  Need for Lines, real assessed. Yes  Restraint Reevaluation     Yes  I have reevaluated the patient one hour after initiation of intervention. The patient is comfortable, uninjured, but continues to pose an imminent risk of injury to self to themselves and/or serious disruption of medical treatment required to keep patient stable. The patient's current medical and behavioral conditions that warrant the use intervention include danger to self and Interference with medical equipment or treatment. Restraint or seclusion will be discontinued at the earliest possible time, regardless of the scheduled expiration of the order.  Based on my evaluation, restraints will be continued: YES       Family contact/MPOA:   [facility] Other Relative [id number] Other Relative [phone] christopher vaughn Sister [phone]       Family updated: will call today     Palliative consult within 3 days of admission to ICU-  Ethics Guidance: 21 days      Daily Plans:  D/C Epi gtt  Obtain chest x-ray and KUB for abd distention  D/C Vanco, keep Zosyn  Wean Levo, Keep Vaso  Replace arterial line  Follow-up with palliative care regarding goals of care/code status  Hold off  on diuresing for now  Keep OGT to LIWS until able to follow up on KUB  When weaning sedatives, wean Versed first. Can place on Propofol if additional sedative is needed     DAVID time 15 minutes        Shawna Elam, NP  10/24/22  Pulmonary, [address] [facility],[address] [facility]

## 2022-10-24 NOTE — PROCEDURES
[facility]  Arterial  Line Procedure Note    Indication:  respiratory failure, shock, need for frequent ABGs    Risks, benefits, alternatives explained and consent obtained. Line Bundle:   Full sterile barrier precautions used. 7-Step Sterility Protocol followed. (cap, mask sterile gown, sterile gloves, large sterile sheet, hand hygiene, 2% chlorhexidine for cutaneous antisepsis)  5 mL 1% Lidocaine placed at insertion site. Right radial artery cannulated x 1 attempt(s) utilizing the modified Seldinger technique. Good blood return. Catheter secured & Biopatch applied. Sterile Tegaderm placed. First assist:  Nima Cochran NP was present. And supervised this procedure.     Marko Gaytan PA-C  10/24/22  Pulmonary, Critical Care Medicine  [facility]

## 2022-10-24 NOTE — PROGRESS NOTES
[address] [facility] Pharmacokinetic Monitoring Service - Vancomycin    Indication: sepsis  Goal AUC/JOHANNA: 400-600 mg*hr/L  Day of Therapy: 3  Additional Antimicrobials: pip-tazo    Pertinent Laboratory Values: Wt Readings from Last 1 Encounters:   10/22/22 81.6 kg (179 lb 14.3 oz)     Temp Readings from Last 1 Encounters:   10/24/22 98.6 °F (37 °C)     No components found for: PROCAL  Estimated Creatinine Clearance: 103.2 mL/min (based on SCr of 0.71 mg/dL).   Recent Labs     10/24/22  0531 10/23/22  0334   WBC 6.3 9.2     Pertinent Cultures:  Culture Date Source Results   10/20 blood CoNS in 1/2   10/20 sputum  Few E coli   MRSA Nasal Swab: awaiting result    Assessment:  Date/Time Current Dose Concentration (mg/L) Timing of Concentration (h) AUC   10/22 2,000 mg x1 - - -   10/23 1,000 mg q12h - - -   10/24 1,000 mg q12h 10.9 12 393   Note: Serum concentrations collected for AUC dosing may appear elevated if collected in close proximity to the dose administered, this is not necessarily an indication of toxicity    Plan:  Increase to 1250 mg every 12 hours  Order level when clinically appropriate  Pharmacy will continue to monitor patient and adjust therapy as indicated    Thank you for the consult,  Dimas Meyer  10/24/2022

## 2022-10-24 NOTE — PROGRESS NOTES
[facility]: [phone]  [facility]: [phone]     Patient Name: Valentin Baez  YOB: 1955    Date of consult : 10/21/22  Date of follow-up:  10/24/2022  Reason for Consult: establish goals of care  Requesting Provider:  Bernice AZUL   Primary Care Physician: Yonny Tripathi MD      SUMMARY:   Valentin Baez is a 79 y.o. male with a past history of CHF with EF 20%, DVT, A. fib, who was admitted on 10/19/2022 from home with a diagnosis of acute CHF exacerbation and NSTEMI. Current medical issues leading to Palliative Medicine involvement include: Pt with a long term life limiting chronic disease process that warrants discussions about his goals of care. .    CHIEF COMPLAINT: Intubated and mechanically ventilated due to acute respiratory failure    HPI/SUBJECTIVE:    Patient is a 57-year-old -American male that lives at home with his sister Ochoa Lentz. He was brought in through the emergency department with complaints of shortness of breath and hypoxia. He later developed severe respiratory arrest and was intubated. Prior to respiratory distress he told the physicians that he did not want CPR/compressions and was put in as a partial code. 10/24/2022  lying in bed, orally intubated, sedated, on pressors x2, Fi02 55%, PEEP 10    The patient is:   [] Verbal and participatory  [x] Non-participatory due to: Intubated and sedated    GOALS OF CARE:  Patient/Health Care Proxy Stated Goals: Prolong life      TREATMENT PREFERENCES:   Code Status: DNR         PALLIATIVE DIAGNOSES:   Goals of care/ACP  Acute respiratory failure  Congestive heart failure  Encounter for palliative medicine       PLAN:   10/24/2022:  seen at bedside along with Ms. Marc LMSW. Patient is lying in bed, orally intubated, sedate, on pressor support x2, Fi02 55% PEEP 10.   Will open eyes when name called but no tracking, no command following. ~1600:  Family meeting with palliative team including Ms. Tran, CATRACHITA, patient's sister/MPOA, Keisha Smith and Consuelo's daughter in patient's room. Family received medical update from ICU team earlier this afternoon when new A-line inserted. Family understands that cardiology does not have any further treatments to offer and is recommending comfort care/hospice; however, [facility] shared that patient's other sister from [place] has not been able to visit as yet and they do not wish to make any changes to goals of care until this sister has been able to come and visit. This is anticipated to occur on Wednesday. Family shared that patient is now opening his eyes, moving his head and watching TV. When asked about her thoughts regarding comfort care and hospice, [facility] shared that they are not interested in this at present time and remain hopeful that patient will be able to be liberated from the ventilator and that pressor support can be weaned. Family is pleased with the level of care being provided to patient currently and wish to continue current treatment at this time. Goals of care remain DNR as per ICU team discussion with MPOA over the weekend. Please see below for previous conversations with the palliative medicine team:    Goals of care/ACP  This NP along with Douglas Waters RN in to see patient at the bedside. He is intubated and mechanically ventilated on 3 pressor support and unable to participate in a goals of care discussion. We have reached out to his Sister Keisha Smith who states that she has legal medical power of  and that her sister William Lomeli has financial power of  but we can speak to either of them. She reconfirmed his partial CODE STATUS stating that both of them have always stated that they would not want chest compressions at end-of-life.   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 He was a \"heavy\" smoker and had an overall poor diet eating [employer] foods for years. They also relay that their family has a long history of heart disease. Pt had been recuperating at home post his stay at [facility] when he became more SOB and reported some chest pain. I outlined some of our concerns for his recovery and they remain hopeful. [facility] did bring in a MPOA for her sister Marcell Levin who was also in attendance at our meeting. Goals of care: Partial code no CPR with cardiopulmonary arrest okay with shocks, medication, and intubation  Acute respiratory failure  Patient intubated 2 days ago with now high PEEP of 10 and FiO2 of 40%. Patient initially admitted with hypoxic respiratory failure stabilized on 6 L but later went into severe respiratory distress requiring intubation. Congestive heart failure  Listed ejection fraction of 20% last echocardiogram shows 25 to 30%. Patient reported in acute congestive heart failure on arrival to the emergency department. Seen by cardiology noted patient in cardiogenic shock. Also reported patient had NSTEMI with elevated troponin patient declined for ECMO or candidate for CABG/PCI per [facility] cardiology. Cardiac MRI shows enlarged LV with severe decreased function LVEF of 23.9%  Encounter for palliative medicine  Patient has extensive cardiac history now with exacerbation of his congestive heart failure and NSTEMI. Likely poor long-term outcomes. Patient supported with 3 pressors. Have spoken to his Sister Silvina OlivaresANGELES to inform her that we may need to set up a meeting to have further discussions depending on how patient does with weaning from ventilator and pressor supports. Our team will continue to follow.    Initial consult note routed to primary continuity provider  Communicated plan of care with: Palliative IDT      Advance Care Planning:  [] [facility] Interdisciplinary Team has updated the ACP Navigator with [address] and Patient Capacity    Primary Decision [facility] ([address]):   Primary Decision Aston Blandon - [phone]    Medical Interventions: Limited additional interventions   Other Instructions:         As far as possible, the palliative care team has discussed with patient / health care proxy about goals of care / treatment preferences for patient. HISTORY:     History obtained from: Chart review   Active Problems:    Respiratory failure with hypoxia ([address] (10/20/2022)    Past Medical History:   Diagnosis Date    Elevated PSA     Mental health disorder       Past Surgical History:   Procedure Laterality Date    HX COLONOSCOPY        Family History   Family history unknown: Yes     History reviewed, no pertinent family history.   Social History     Tobacco Use    Smoking status: Every Day    Smokeless tobacco: Not on file   Substance Use Topics    Alcohol use: No     No Known Allergies   Current Facility-Administered Medications   Medication Dose Route Frequency    sodium phosphate 9 mmol in 0.9% sodium chloride 250 mL infusion  9 mmol IntraVENous ONCE    piperacillin-tazobactam (ZOSYN) 3.375 g in 0.9% sodium chloride (MBP/ADV) 100 mL MBP  3.375 g IntraVENous Q8H    amiodarone (NEXTERONE) 360 mg in dextrose 200 mL (1.8 mg/mL) infusion  0.5-1 mg/min IntraVENous TITRATE    [Held by provider] furosemide (LASIX) injection 40 mg  40 mg IntraVENous BID    glucose chewable tablet 16 g  4 Tablet Oral PRN    glucagon (GLUCAGEN) injection 1 mg  1 mg IntraMUSCular PRN    dextrose 10% infusion 0-250 mL  0-250 mL IntraVENous PRN    insulin lispro (HUMALOG) injection   SubCUTAneous Q6H    sodium chloride (NS) flush 5-40 mL  5-40 mL IntraVENous Q8H    sodium chloride (NS) flush 5-40 mL  5-40 mL IntraVENous PRN    ondansetron (ZOFRAN ODT) tablet 4 mg  4 mg Oral Q8H PRN    Or    ondansetron (ZOFRAN) injection 4 mg  4 mg IntraVENous Q6H PRN    famotidine (PF) (PEPCID) 20 mg in 0.9% sodium chloride 10 mL injection  20 mg IntraVENous Q12H    midazolam (VERSED) injection 2 mg  2 mg IntraVENous Q10MIN PRN    fentaNYL citrate (PF) injection 100 mcg  100 mcg IntraVENous Q30MIN PRN    chlorhexidine (PERIDEX) 0.12 % mouthwash 10 mL  10 mL Oral Q12H    fentaNYL (PF) 1,500 mcg/30 mL (50 mcg/mL) infusion  0-200 mcg/hr IntraVENous TITRATE    heparin (porcine) 25,000 units in 0.45% saline 250 ml infusion  11.79-27 Units/kg/hr IntraVENous TITRATE    aspirin chewable tablet 81 mg  81 mg Per NG tube DAILY    atorvastatin (LIPITOR) tablet 40 mg  40 mg Per NG tube QHS    vasopressin (VASOSTRICT) 20 Units in 0.9% sodium chloride 100 mL infusion  0.03 Units/min IntraVENous CONTINUOUS    NOREPINephrine (LEVOPHED) 32,000 mcg in dextrose 5% 250 mL (128 mcg/mL) infusion  0.5-50 mcg/min IntraVENous TITRATE    acetaminophen (TYLENOL) suppository 650 mg  650 mg Rectal Q4H PRN          Clinical Pain Assessment (nonverbal scale for nonverbal patients): Clinical Pain Assessment  Severity: 0     Activity (Movement): Lying quietly, normal position    Duration: for how long has pt been experiencing pain (e.g., 2 days, 1 month, years)  Frequency: how often pain is an issue (e.g., several times per day, once every few days, constant)     FUNCTIONAL ASSESSMENT:     Palliative Performance Scale (PPS):  PPS: 40    ECOG  ECOG Status : Completely disabled     PSYCHOSOCIAL/SPIRITUAL SCREENING:      Any spiritual / Nondenominational concerns:  [] Yes /  [x] No    Caregiver Burnout:  [] Yes /  [x] No /  [] No Caregiver Present      Anticipatory grief assessment:   [x] Normal  / [] Maladaptive        REVIEW OF SYSTEMS:     Systems: constitutional, ears/nose/mouth/throat, respiratory, gastrointestinal, genitourinary, musculoskeletal, integumentary, neurologic, psychiatric, endocrine. Positive findings noted below.   Modified ESAS Completed by: provider           Pain: 0           Dyspnea: 0               Positive and pertinent negative findings in ROS are noted above in HPI. The following systems were [] reviewed / [x] unable to be reviewed as noted in HPI  Other findings are noted below. PHYSICAL EXAM:     Constitutional: orally intubated and sedated, on pressor support x2  Eyes: pupils equal, anicteric  ENMT: orally intubated   Cardiovascular: hypotensive on pressor support  Respiratory: mechanically ventilated PEEP 10 with Fi02 55%  Gastrointestinal: soft non-tender  Last bowel movement: none recorded  Musculoskeletal: no deformity, no tenderness to palpation  Skin: warm, dry  Neurologic: opens eyes in response to name call, not following commands, not moving all extremities  Psychiatric: unable to assess    Other: Wt Readings from Last 3 Encounters:   10/24/22 81.6 kg (179 lb 14.3 oz)   11/16/17 95.3 kg (210 lb)   05/22/15 87.1 kg (192 lb)     Blood pressure 99/71, pulse 92, temperature 98.1 °F (36.7 °C), resp. rate 23, height 5' 7\" (1.702 m), weight 81.6 kg (179 lb 14.3 oz), SpO2 100 %. Pain:  Pain Scale 1: Adult Nonverbal Pain Scale                         LAB AND IMAGING FINDINGS:     Lab Results   Component Value Date/Time    WBC 6.3 10/24/2022 05:31 AM    HGB 8.8 (L) 10/24/2022 05:31 AM    PLATELET 185 78/18/6218 05:31 AM     Lab Results   Component Value Date/Time    Sodium 141 10/24/2022 05:31 AM    Potassium 3.6 10/24/2022 05:31 AM    Chloride 105 10/24/2022 05:31 AM    CO2 29 10/24/2022 05:31 AM    BUN 22 (H) 10/24/2022 05:31 AM    Creatinine 0.71 10/24/2022 05:31 AM    Calcium 9.2 10/24/2022 05:31 AM    Magnesium 2.3 10/24/2022 05:31 AM    Phosphorus 2.2 (L) 10/24/2022 05:31 AM      Lab Results   Component Value Date/Time    Alk.  phosphatase 63 10/19/2022 06:41 PM    Protein, total 7.2 10/19/2022 06:41 PM    Albumin 3.0 (L) 10/24/2022 05:31 AM    Globulin 3.9 10/19/2022 06:41 PM     Lab Results   Component Value Date/Time    INR 1.1 10/19/2022 06:41 PM    Prothrombin time 14.9 10/19/2022 06:41 PM    aPTT 74.3 (H) 10/24/2022 05:31 AM      No results found for: IRON, FE, TIBC, IBCT, PSAT, FERR   No results found for: PH, PCO2, PO2  No components found for: [place]   Lab Results   Component Value Date/Time     03/16/2011 01:56 AM    CK - MB 1.9 03/16/2011 01:56 AM              Total time: 35 minutes     > 50% counseling / coordination:  Time spent in direct consultation with the patient, medical team, and family     Prolonged service was provided for  []30 min   []75 min in face to face time in the presence of the patient, spent as noted above. Time Start:   Time End:     Disclaimer: Sections of this note are dictated using utilizing voice recognition software, which may have resulted in some phonetic based errors in grammar and contents. Even though attempts were made to correct all the mistakes, some may have been missed, and remained in the body of the document. If questions arise, please contact our department.

## 2022-10-24 NOTE — PROGRESS NOTES
[facility] - Progress Note  Admit Date: 10/19/2022    Assessment:      -Acute hypoxic respiratory failure, requiring intubation and mechanical ventilation.    -Cardiogenic shock, on pressor support. -A/c HFrEF/ICMY. CXR with pulmonary edema. LVEF 20-30%  -NSTEMI, troponin peaked at 8773.   -3V CAD. S/p LHC in setting of late presentation MI s/p IABP. Not a candidate for CABG/PCI at [facility] cardiology. No viability by cardiac MRI at [facility] along anterior wall. Cath findings as noted below:   Left main coronary artery: Short, patent   Left anterior descending coronary artery: 50% ostial; 99% mid   BRAULIO 1 flow to mid/distal LAD with right to left collaterals 70% diagonal 1   Left circumflex coronary artery: Non-dominant, 100% proximal   Distal fed by right to left collaterals   Right coronary artery:   Dominant, diffuse 80% proximal to mid 70% at crux into posterior descending artery   Short posterolateral branch   Collaterals to left circumflex and left anterior descending    Cardiac MRI  Enlarged LV with severely decreased function. LVEF of 23.9%. No viability in the proximal mid to distal LAD and and basal to apical circumflex territory. 4. Approximately 80% viability in the RCA territory. -PAF, on amiodarone and Eliquis as outpatient   -HTN, on Entresto, aldactone, lasix and lopressor as outpatient. -HLD  -DM  -H/o LLE DVT. -Tobacco abuse   -Partial code. Primary cardiologist is at [facility]. Plan:     Patient has extensive coronary artery disease with no viable options. Continued on IV Amiodarone. Continue ASA, statin. Wean pressors as able. Diuretics held for worsening hypotension. Resume diuresis as pressures allow. Continue supportive care.     -----------------------------------  Continue amiodarone for now. Rare PACs. Continue to wean pressors. Overall prognosis guarded.     I saw, examined, and evaluated this patient and performed the substantive portion of the encounter for > 50% of the time including extensive history, physical exam, and medical decision making as discussed with patient and next-of-kin as needed. I personally reviewed the patient's labs, tests, vitals, orders, medications, updated history, and other providers assessments as well. I personally agree with the findings as stated and the plan as documented. Gil Menon MD      Subjective: Intubated.      Objective:      Patient Vitals for the past 8 hrs:   Temp Pulse Resp BP SpO2   10/24/22 0912 -- 79 15 -- 100 %   10/24/22 0600 -- 82 15 103/66 100 %   10/24/22 0545 -- 77 14 103/73 100 %   10/24/22 0530 -- 85 14 104/81 100 %   10/24/22 0515 -- 90 11 101/71 100 %   10/24/22 0500 -- 89 15 105/79 100 %   10/24/22 0445 -- 99 14 109/65 100 %   10/24/22 0430 -- 77 16 107/82 100 %   10/24/22 0415 -- 77 14 109/74 100 %   10/24/22 0400 98.6 °F (37 °C) 88 14 108/78 100 %           Patient Vitals for the past 96 hrs:   Weight   10/22/22 0500 81.6 kg (179 lb 14.3 oz)   10/21/22 0400 82.6 kg (182 lb 1.6 oz)   10/20/22 1414 81.6 kg (180 lb)                      Intake/Output Summary (Last 24 hours) at 10/24/2022 1150  Last data filed at 10/24/2022 0829  Gross per 24 hour   Intake 1808.79 ml   Output 1380 ml   Net 428.79 ml         Physical Exam:  General:  intubated on ventilator   Neck:  JVD   Lungs:  decreased B/L   Heart: RRR S1, S2 normal, no murmur, click, rub or gallop  Abdomen:  abdomen is soft   Extremities:  extremities normal, atraumatic, no cyanosis or edema    Data Review:     Labs: Results:       Chemistry Recent Labs     10/24/22  0531 10/23/22  2333 10/23/22  1100 10/23/22  0334 10/22/22  1238 10/22/22  0350   * 140* 158* 150*   < > 155*    141 140 140   < > 140   K 3.6 3.9 3.7 3.8   < > 3.7    106 107 104   < > 103   CO2 29 29 27 26   < > 30   BUN 22* 21* 20* 20*   < > 20*   CREA 0.71 0.84 0.70 0.77   < > 0.92   CA 9.2 8.7 8.7 9.1   < > 8.6   MG 2.3  --   --  2.3  --  2.2 PHOS 2.2* 2.5 2.3* 1.9*   < > 2.2*   AGAP 7 6 6 10   < > 7   BUCR 31* 25* 29* 26*   < > 22*   ALB 3.0* 3.0* 3.0* 3.2*   < > 2.8*    < > = values in this interval not displayed.         CBC w/Diff Recent Labs     10/24/22  0531 10/23/22  0334 10/22/22  0350   WBC 6.3 9.2 10.5   RBC 3.11* 3.19* 3.34*   HGB 8.8* 8.7* 9.2*   HCT 28.7* 28.8* 30.6*    214 230   GRANS 72 83* 73   LYMPH 17* 10* 18*   EOS 3 0 1        Cardiac Enzymes No results found for: CPK, CK, CKMMB, CKMB, RCK3, CKMBT, CKNDX, CKND1, LAURA, TROPT, TROIQ, LISA, TROPT, TNIPOC, BNP, BNPP   Coagulation Recent Labs     10/24/22  0531 10/23/22  2333   APTT 74.3* 49.6*         Lipid Panel No results found for: CHOL, CHOLPOCT, CHOLX, CHLST, CHOLV, 959573, HDL, HDLP, LDL, LDLC, DLDLP, 218432, VLDLC, VLDL, TGLX, TRIGL, TRIGP, TGLPOCT, CHHD, CHHDX   BNP No results found for: BNP, BNPP, XBNPT   Liver Enzymes Recent Labs     10/24/22  0531   ALB 3.0*        Digoxin    Thyroid Studies Lab Results   Component Value Date/Time    TSH 1.06 10/20/2022 05:15 AM          Signed By: Jolene Cervantes PA-C     October 24, 2022

## 2022-10-24 NOTE — PALLIATIVE CARE
[address] [facility] [phone]  [facility] [address], NP and this LMSW attended to pt at bedside for follow up assessment. Per notes, Cardiology is recommending hospice, and that they have spoken with pt's family about this. Upon entry, pt laying in bed with head elevated, intubated. Pt's eyes open, but no tracking or response to verbal or tactile stimuli. NP and this LMSW made telephone contact with pt's sister, Lacho Tan, at [phone]. Nehemiah Wyman reports she did speak with cardiology, understood everything they said. She asked how pt is doing this morning. NP updated her about how pt was at time of visit and that he continue to require pressor support to maintain BP. Nehemiah Wyman reports she's coming today and asked if can meet then. Meeting planned for 1600 hr., today. 1627 hr. Family meeting with pt's sister/MPoA Lacho Tan and her daughter at bedside. Per Nehemiah Wyman, sister with whom pt is living now, just left a few minutes ago. Nehemiah Wyman reports she received update from ICU NP, Jayashree Morrison a little while ago, and that they placed a line to help monitor pt's BP. NP addressed Cardiologist's recommendation for hospice. Pt's sister reports, he's alert today. He responds to us. He is watching the t.v. NP spoke to pt and he opened his eyes, but did not track or respond in any other way. LMSW standing at end of bed, attempted to get him to track to her with verbal cues and hand waving. Pt's line of site did not change at all. She states at this time they want to continue all current measures. She reports her sister from [place] is going to be up from [place] on Weds. And they might discuss again after she comes up. LMSW provided clarification that Hospice is not just for last days of life, but if pt is able to get off vent, he may be able to go home on hospice and spend what time he has left with family, doing what makes him happy.   Pt's sister verbalized understanding; stands firm in her decision to not discuss hospice at this time. Thank you for this referral to Palliative Care. The palliative care team remains available to provide support for patient and his family. Goals of care discussed, family wishes to continue current treatment at this time.      Sukumar Cleveland, [address] [facility]  Palliative Medicine Inpatient   [facility]  Palliative COPE Line: [phone]

## 2022-10-24 NOTE — PROGRESS NOTES
2320: Phlebotomist at the bedside obtaining blood specimen. 2345: Bedside and Verbal shift change report given to Olya Hernandez RN (oncoming nurse) by Padma Aguirre RN (offgoing nurse). Report included the following information SBAR, Kardex, ED Summary, Intake/Output, MAR, Recent Results, and Cardiac Rhythm SR HR 80-90's .     0000: Assessment completed, see doc flow sheet. 0400: Assessment completed, see doc flow sheet. 0700: Bedside and Verbal shift change report given to Dennys Logan RN (oncoming nurse) by Olya Hernandez RN (offgoing nurse).  Report included the following information SBAR, Kardex, ED Summary, Intake/Output, MAR, Recent Results, and Cardiac Rhythm SR .

## 2022-10-24 NOTE — PROGRESS NOTES
[facility]: [phone]  [facility]: [phone]     Patient Name: Estee Buckner  YOB: 1955    Date of progress note : 10/24/22  Reason for Consult: establish goals of care  Requesting Provider:  Suzi AZUL   Primary Care Physician: Bravo Chavez MD      SUMMARY:   Estee Buckner is a 79 y.o. male with a past history of CHF with EF 20%, DVT, A. fib, who was admitted on 10/19/2022 from home with a diagnosis of acute CHF exacerbation and NSTEMI. Current medical issues leading to Palliative Medicine involvement include: Pt with a long term life limiting chronic disease process that warrants discussions about his goals of care. .    CHIEF COMPLAINT: Intubated and mechanically ventilated due to acute respiratory failure    HPI/SUBJECTIVE:    Patient is a 63-year-old -American male that lives at home with his sister Steven Ritchie. He was brought in through the emergency department with complaints of shortness of breath and hypoxia. He later developed severe respiratory arrest and was intubated. Prior to respiratory distress he told the physicians that he did not want CPR/compressions and was put in as a partial code. 10/24/22: Patient remains intubated on relatively high vent settings with a PEEP of 10 and FiO2 of 55%. He has his eyes open but does not appear to be tracking or following commands    The patient is:   [] Verbal and participatory  [x] Non-participatory due to: Intubated and sedated    GOALS OF CARE:  Patient/Health Care Proxy Stated Goals: Prolong life      TREATMENT PREFERENCES:   Code Status: DNR         PALLIATIVE DIAGNOSES:   Goals of care/ACP  Acute respiratory failure  Congestive heart failure  Encounter for palliative medicine       PLAN:   Goals of care/ACP  10/24/2022: This NP along with Gala Rodriguez LMSW in to see the patient at the bedside.   He remains intubated and mechanically ventilated. He also remains on 3 pressor supports with relatively high vent settings. Patient remains on light sedation. Have reached out to his Sister James Graham who is planning to come in today to discuss further goals of care. She relays that she had a conversation with the cardiologist over the weekend and understands that there is no procedure for his heart conditions and that he has a \"weak heart\" our concerns are if he would be able to be medically liberated from the ventilator safely versus moving him to more comfort care with compassionate extubation. Goals of care patient is a DNR no cardiopulmonary resuscitation if his heart stops but he remains intubated and mechanically ventilated    10/21/22: This NP along with Reynaldo Lopez RN in to see patient at the bedside. He is intubated and mechanically ventilated on 3 pressor support and unable to participate in a goals of care discussion. We have reached out to his Sister Andrew Dawn who states that she has legal medical power of  and that her sister Cherry Pisano has financial power of  but we can speak to either of them. She reconfirmed his partial CODE STATUS stating that both of them have always stated that they would not want chest compressions at end-of-life. 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 He was a \"heavy\" smoker and had an overall poor diet eating [employer] foods for years. They also relay that their family has a long history of heart disease. Pt had been recuperating at home post his stay at [facility] when he became more SOB and reported some chest pain. I outlined some of our concerns for his recovery and they remain hopeful. [facility] did bring in a MPOA for her sister James Graham who was also in attendance at our meeting.    Goals of care: Partial code no CPR with cardiopulmonary arrest okay with shocks, medication, and intubation  Acute respiratory failure  Patient intubated 2 days ago with now high PEEP of 10 and FiO2 of 40%. Patient initially admitted with hypoxic respiratory failure stabilized on 6 L but later went into severe respiratory distress requiring intubation. Congestive heart failure  Listed ejection fraction of 20% last echocardiogram shows 25 to 30%. Patient reported in acute congestive heart failure on arrival to the emergency department. Seen by cardiology noted patient in cardiogenic shock. Also reported patient had NSTEMI with elevated troponin patient declined for ECMO or candidate for CABG/PCI per [facility] cardiology. Cardiac MRI shows enlarged LV with severe decreased function LVEF of 23.9%  Encounter for palliative medicine  Patient has extensive cardiac history now with exacerbation of his congestive heart failure and NSTEMI. Likely poor long-term outcomes. Patient supported with 3 pressors. Have spoken to his Sister Sang ANGELES Martinez to inform her that we may need to set up a meeting to have further discussions depending on how patient does with weaning from ventilator and pressor supports. Our team will continue to follow. Initial consult note routed to primary continuity provider  Communicated plan of care with: Palliative IDT      Advance Care Planning:  [] The [facility] Interdisciplinary Team has updated the ACP Navigator with [address] and Patient Capacity    Primary Decision [facility] ([address]):   Primary Decision [address] Sister - [phone]    Medical Interventions: Limited additional interventions   Other Instructions:         As far as possible, the palliative care team has discussed with patient / health care proxy about goals of care / treatment preferences for patient.          HISTORY:     History obtained from: Chart review   Active Problems:    Respiratory failure with hypoxia ([address] (10/20/2022)    Past Medical History: Diagnosis Date    Elevated PSA     Mental health disorder       Past Surgical History:   Procedure Laterality Date    HX COLONOSCOPY        Family History   Family history unknown: Yes     History reviewed, no pertinent family history.   Social History     Tobacco Use    Smoking status: Every Day    Smokeless tobacco: Not on file   Substance Use Topics    Alcohol use: No     No Known Allergies   Current Facility-Administered Medications   Medication Dose Route Frequency    piperacillin-tazobactam (ZOSYN) 3.375 g in 0.9% sodium chloride (MBP/ADV) 100 mL MBP  3.375 g IntraVENous Q8H    amiodarone (NEXTERONE) 360 mg in dextrose 200 mL (1.8 mg/mL) infusion  0.5-1 mg/min IntraVENous TITRATE    [Held by provider] furosemide (LASIX) injection 40 mg  40 mg IntraVENous BID    glucose chewable tablet 16 g  4 Tablet Oral PRN    glucagon (GLUCAGEN) injection 1 mg  1 mg IntraMUSCular PRN    dextrose 10% infusion 0-250 mL  0-250 mL IntraVENous PRN    insulin lispro (HUMALOG) injection   SubCUTAneous Q6H    sodium chloride (NS) flush 5-40 mL  5-40 mL IntraVENous Q8H    sodium chloride (NS) flush 5-40 mL  5-40 mL IntraVENous PRN    ondansetron (ZOFRAN ODT) tablet 4 mg  4 mg Oral Q8H PRN    Or    ondansetron (ZOFRAN) injection 4 mg  4 mg IntraVENous Q6H PRN    famotidine (PF) (PEPCID) 20 mg in 0.9% sodium chloride 10 mL injection  20 mg IntraVENous Q12H    midazolam (VERSED) injection 2 mg  2 mg IntraVENous Q10MIN PRN    fentaNYL citrate (PF) injection 100 mcg  100 mcg IntraVENous Q30MIN PRN    chlorhexidine (PERIDEX) 0.12 % mouthwash 10 mL  10 mL Oral Q12H    fentaNYL (PF) 1,500 mcg/30 mL (50 mcg/mL) infusion  0-200 mcg/hr IntraVENous TITRATE    midazolam in normal saline (VERSED) 1 mg/mL infusion  0-10 mg/hr IntraVENous TITRATE    heparin (porcine) 25,000 units in 0.45% saline 250 ml infusion  11.79-27 Units/kg/hr IntraVENous TITRATE    aspirin chewable tablet 81 mg  81 mg Per NG tube DAILY    atorvastatin (LIPITOR) tablet 40 mg 40 mg Per NG tube QHS    vasopressin (VASOSTRICT) 20 Units in 0.9% sodium chloride 100 mL infusion  0.03 Units/min IntraVENous CONTINUOUS    NOREPINephrine (LEVOPHED) 32,000 mcg in dextrose 5% 250 mL (128 mcg/mL) infusion  0.5-50 mcg/min IntraVENous TITRATE    acetaminophen (TYLENOL) suppository 650 mg  650 mg Rectal Q4H PRN          Clinical Pain Assessment (nonverbal scale for nonverbal patients): Activity (Movement): Lying quietly, normal position    Duration: for how long has pt been experiencing pain (e.g., 2 days, 1 month, years)  Frequency: how often pain is an issue (e.g., several times per day, once every few days, constant)     FUNCTIONAL ASSESSMENT:     Palliative Performance Scale (PPS):  PPS: 40    ECOG  ECOG Status : Completely disabled     PSYCHOSOCIAL/SPIRITUAL SCREENING:      Any spiritual / Sabianism concerns:  [] Yes /  [x] No    Caregiver Burnout:  [] Yes /  [x] No /  [] No Caregiver Present      Anticipatory grief assessment:   [x] Normal  / [] Maladaptive        REVIEW OF SYSTEMS:     Systems: constitutional, ears/nose/mouth/throat, respiratory, gastrointestinal, genitourinary, musculoskeletal, integumentary, neurologic, psychiatric, endocrine. Positive findings noted below. Modified ESAS Completed by: provider                       Dyspnea: 5               Positive and pertinent negative findings in ROS are noted above in HPI. The following systems were [x] reviewed / [] unable to be reviewed as noted in HPI  Other findings are noted below.      PHYSICAL EXAM:     Constitutional: intubated and sedated   Eyes: pupils equal, anicteric  ENMT: intubated   Cardiovascular: hypotensive on pressor support  Respiratory: mechanically ventilated PEEP 10 with Fi02 40%  Gastrointestinal: soft non-tender, +bowel sounds  Last bowel movement:   Musculoskeletal: no deformity, no tenderness to palpation  Skin: warm, dry  Neurologic: following commands, moving all extremities  Psychiatric: full affect, no hallucinations    Other: Wt Readings from Last 3 Encounters:   10/22/22 81.6 kg (179 lb 14.3 oz)   11/16/17 95.3 kg (210 lb)   05/22/15 87.1 kg (192 lb)     Blood pressure 103/66, pulse 79, temperature 98.6 °F (37 °C), resp. rate 15, height 5' 7\" (1.702 m), weight 81.6 kg (179 lb 14.3 oz), SpO2 100 %. Pain:  Pain Scale 1: Adult Nonverbal Pain Scale                         LAB AND IMAGING FINDINGS:     Lab Results   Component Value Date/Time    WBC 6.3 10/24/2022 05:31 AM    HGB 8.8 (L) 10/24/2022 05:31 AM    PLATELET 855 17/99/4971 05:31 AM     Lab Results   Component Value Date/Time    Sodium 141 10/24/2022 05:31 AM    Potassium 3.6 10/24/2022 05:31 AM    Chloride 105 10/24/2022 05:31 AM    CO2 29 10/24/2022 05:31 AM    BUN 22 (H) 10/24/2022 05:31 AM    Creatinine 0.71 10/24/2022 05:31 AM    Calcium 9.2 10/24/2022 05:31 AM    Magnesium 2.3 10/24/2022 05:31 AM    Phosphorus 2.2 (L) 10/24/2022 05:31 AM      Lab Results   Component Value Date/Time    Alk. phosphatase 63 10/19/2022 06:41 PM    Protein, total 7.2 10/19/2022 06:41 PM    Albumin 3.0 (L) 10/24/2022 05:31 AM    Globulin 3.9 10/19/2022 06:41 PM     Lab Results   Component Value Date/Time    INR 1.1 10/19/2022 06:41 PM    Prothrombin time 14.9 10/19/2022 06:41 PM    aPTT 74.3 (H) 10/24/2022 05:31 AM      No results found for: IRON, FE, TIBC, IBCT, PSAT, FERR   No results found for: PH, PCO2, PO2  No components found for: [place]   Lab Results   Component Value Date/Time     03/16/2011 01:56 AM    CK - MB 1.9 03/16/2011 01:56 AM              Total time: 65 minutes   Counseling / coordination time, spent as noted above:   > 50% counseling / coordination:  Time spent in direct consultation with the patient, medical team, and family     Prolonged service was provided for  [x]30 min   []75 min in face to face time in the presence of the patient, spent as noted above.   Time Start:   Time End:     Disclaimer: Sections of this note are dictated using utilizing voice recognition software, which may have resulted in some phonetic based errors in grammar and contents. Even though attempts were made to correct all the mistakes, some may have been missed, and remained in the body of the document. If questions arise, please contact our department.

## 2022-10-25 PROBLEM — I48.21 PERMANENT ATRIAL FIBRILLATION (HCC): Status: ACTIVE | Noted: 2022-01-01

## 2022-10-25 PROBLEM — I21.4 NSTEMI (NON-ST ELEVATED MYOCARDIAL INFARCTION) (HCC): Status: ACTIVE | Noted: 2022-01-01

## 2022-10-25 PROBLEM — R57.0 CARDIOGENIC SHOCK (HCC): Status: ACTIVE | Noted: 2022-01-01

## 2022-10-25 PROBLEM — J15.5 E. COLI PNEUMONIA (HCC): Status: ACTIVE | Noted: 2022-01-01

## 2022-10-25 PROBLEM — Z99.11 ON MECHANICALLY ASSISTED VENTILATION (HCC): Status: ACTIVE | Noted: 2022-01-01

## 2022-10-25 PROBLEM — I25.119 CORONARY ARTERY DISEASE INVOLVING NATIVE CORONARY ARTERY OF NATIVE HEART WITH ANGINA PECTORIS (HCC): Status: ACTIVE | Noted: 2022-01-01

## 2022-10-25 PROBLEM — I50.1 ACUTE CARDIOGENIC PULMONARY EDEMA (HCC): Status: ACTIVE | Noted: 2022-01-01

## 2022-10-25 PROBLEM — I47.20 VENTRICULAR TACHYARRHYTHMIA: Status: ACTIVE | Noted: 2022-01-01

## 2022-10-25 PROBLEM — G93.40 ENCEPHALOPATHY: Status: ACTIVE | Noted: 2022-01-01

## 2022-10-25 PROBLEM — I82.409 DVT (DEEP VENOUS THROMBOSIS) (HCC): Status: ACTIVE | Noted: 2022-01-01

## 2022-10-25 NOTE — PROGRESS NOTES
1920 bedside turnover given to me by ANNALISE Bauer. He is awake, eyes open and shaking his head appropriately. Vitals assessed also checked bp on arms and on A-line for matching results. 2000 vitals reassessed and suction inline and orally repositioned and arms elevated,   2100 medications given asked if he would like to listen to some music, he shook his head yes, his temperature is WNL however he is diaphoretic, forehead and face wet, asked if he felt hot he shook his head no, wiped down with cool wipes  2200 pt reassessed, no needs at this time, oral care and suction provided  2300 blood sugar assessed and needs assessed  0350 restraints removed on neuro assessment pt able to shake head yes and no and follow directions and commands. Informed him of the importance of keeping all of his equipment alone and he nodded. 0400 appt returned. Bolus given however the dose not changed as it is at the highest prescribed. 0500 blood glucose assessed and he was bathed head to toe with chlorhexidine pads and bedding changed    0700 bedside turnover given to ANNALISE Richards MAR< ED summary and a chance to ask questions. Drips verified by this author and ANNALISE Bauer. Bed is in the lowest position with wheels locked, call bell is within reach on his bed. Bed alarm is on for safety. Partial code, no compressions  10/19/22 arrived ED ambulatory c/o sob oxygen on RA was 76%. Placed on Hi Flow Nasal Cannula. Pt stated \"I think I had a fever and I have had a cough\".  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  Hx of CAD  s/p cardiac cath 7/22 with LAD, circumflex and R coronary artery occlusion, s/p intra-aortic balloon pump assist 8/22 at [facility]  Hx of CHF - EF 20% 8/22  Hx of BPH  Unspecified psych disorder - previously on Risperidone, Paxil, Geodon         Hx blood clots and MI  In July he was admitted at [facility] for DVT and NSTEMI in cardiogenic shock and had a balloon pump was not a candidate for CABG or PCI at [facility]        Neuro eyes open spontaneously, pt receiving fentanyl for sedation    Resp ET tube 7.5  24 at the [facility]  Cardiac  sinus arrythmia with underling A fib  EF 20%, has 3 vessel CAD,   Gi  Gu  Lines  Skin          Problem: Ventilator Management  Goal: *Adequate oxygenation and ventilation  Outcome: Progressing Towards Goal  Goal: *Patient maintains clear airway/free of aspiration  Outcome: Progressing Towards Goal  Goal: *Absence of infection signs and symptoms  Outcome: Progressing Towards Goal  Goal: *Normal spontaneous ventilation  Outcome: Progressing Towards Goal     Problem: Non-Violent Restraints  Goal: Removal from restraints as soon as assessed to be safe  Outcome: Progressing Towards Goal  Goal: No harm/injury to patient while restraints in use  Outcome: Progressing Towards Goal  Goal: Patient's dignity will be maintained  Outcome: Progressing Towards Goal  Goal: Patient Interventions  Outcome: Progressing Towards Goal     Problem: Falls - Risk of  Goal: *Absence of Falls  Description: Document Kelsie Swan Fall Risk and appropriate interventions in the flowsheet.   Outcome: Progressing Towards Goal  Note: Fall Risk Interventions:       Mentation Interventions: Adequate sleep, hydration, pain control, Bed/chair exit alarm, Door open when patient unattended, Evaluate medications/consider consulting pharmacy, More frequent rounding, Reorient patient, Room close to nurse's station, Toileting rounds, Update white board    Medication Interventions: Assess postural VS orthostatic hypotension, Bed/chair exit alarm, Evaluate medications/consider consulting pharmacy, Patient to call before getting OOB    Elimination Interventions: Bed/chair exit alarm, Call light in reach, Patient to call for help with toileting needs, Toileting schedule/hourly rounds

## 2022-10-25 NOTE — DIABETES MGMT
Glycemic Control:  Pt with no history of Diabetes. Pt's blood glucose readings are in the target range. Recent Glucose Results:   Lab Results   Component Value Date/Time     (H) 10/25/2022 03:10 AM     (H) 10/24/2022 09:55 PM    GLUCPOC 138 (H) 10/25/2022 05:26 AM    GLUCPOC 112 (H) 10/24/2022 11:34 PM    GLUCPOC 117 (H) 10/24/2022 04:17 PM     Lab Results   Component Value Date/Time    Hemoglobin A1c 5.6 10/22/2022 03:50 AM     Continue to monitor for glycemic control.   Anastasiya Naik MS RD CDE

## 2022-10-25 NOTE — INTERDISCIPLINARY ROUNDS
[facility]  Pulmonary, Critical Care, and Sleep Medicine  Interdisciplinary and Ventilator Weaning Rounds    Patient discussed in morning walking rounds and interdisciplinary rounds. ICU day: admitted 10/20    Overnight events:   No acute events overnight  CT A/P ordered to r/o SBO vs. Ileus, not done overnight     Assessments and best practice:  Ventilator  Ventilator day 10/20  Vent settings: FIO2 40% PEEP 10   VAP bundle, aim to keep peak plateau pressure 32-13XR H2O  Weaning assessed and documented   Patient does not meet criteria for SBT. Patient is not on sedation holiday. Plan to wean with PS na. Outcome: Remain on full support    Final plan: Remain on mechanical ventilator, wean FIO2   Sedation  Precedex   Other pertinent drips  Heparin, levophed, vasopressin, Amiodarone   Lines noted  Right Radial Arterial Line , L Fem CVL, PIV, real, OGT, ETT   Critical labs assessed  Yes  Antibiotics  Zosyn   Medications reviewed  Yes  Pending imaging  CT A/P tp r/o SBO vs. Ileus   Pending send out labs  No  Pending Procedures  None   Glycemic control  SSI   Stress ulcer prophylaxis. Pepcid  DVT prophylaxis. Heparin gtt  Need for Lines, real assessed.   Yes  Restraint Reevaluation     Patient does not require restraints     Family contact/MPOA:   [facility] Other Relative [address] Other Relative [phone]     Juan Knight Sister [phone]       Family updated: will call today     Palliative consult within 3 days of admission to ICU-  Ethics Guidance: 21 days      Daily Plans:  CT A/P to r/o Ileus vs. SBO   Wean Levo, Keep Vaso  When weaning sedatives, wean Versed first. Can place on Propofol if additional sedative is needed   Replace potassium   Start trickle if CT A/P without ileus or SBO  Hold diuretic for now   Start Precedex and wean Fentanyl gtt   SBT, doing well so far   Start Melatonin if CT A/P ok     DAVID time 15 minutes        Shawna Elam NP  10/25/22  Pulmonary, Critical Care Medicine  [facility]

## 2022-10-25 NOTE — PROGRESS NOTES
PCCM Update:    Patient went into Vfib that transitioned into Vtach. Arterial line without BP reading and without palpable or doppler pulse. Patient DNR per family wishes. Pau Loo, contacted to inform her of her brother's death. She is contacting her sister An Euceda. Will follow-up to determine if they are able to see patient at bedside.      Adriel Valle, AGACNP-BC  10/25/22  Pulmonary, Critical Care Medicine  [facility]

## 2022-10-25 NOTE — PROGRESS NOTES
10/24/22 2057   Patient Observations   Pulse (Heart Rate) (!) 120   Resp Rate 30   O2 Sat (%) 100 %   Airway - Endotracheal Tube 10/20/22 Oral   Placement Date/Time: 10/20/22 0004   Number of Attempts: 1  Inserted By:   Present on Admission/Arrival: No  Location: Oral  Placement Verified: Auscultation;EtCO2;Chest x-ray  Airway Types: Endotracheal, cuffed  Airway Tube Size: 7.5 mm  An. .. Insertion Depth (cm) 24 cm   Line Kyle Lips   Side Secured Device; Centered   Cuff Pressure   (MLT)   Site Assessment Clean, dry, & intact   Respiratory   Respiratory (WDL) X   Patient on Vent Yes - If patient is on vent, add Doc Flowsheet Ventilator ().    Respiratory Pattern Regular   Upper Airway Sounds Coarse   Chest/Tracheal Assessment Chest expansion, symmetrical   Cough Productive;Cough with suction   Airway Clearance   Suction ET Tube;Oral   Suction Device Inline suction catheter;Raulitokauer   Sputum Method Obtained Endotracheal   Sputum Amount Small   Sputum Color/Odor White   Sputum Consistency Thick   Vent Settings   FIO2 (%) 55 %   SpO2/FIO2 Ratio 181.82   CMV Rate Set 14   Back-Up Rate 14   PC Set 12   PEEP/VENT (cm H2O) 10 cm H20   Insp Time (sec) 0.9 sec   Insp Rise Time % 50 %   Flow Trigger 3   Ventilator Measurements   Resp Rate Observed 30   Vt Exhaled (Machine Breath) (ml) 335 ml   Ve Observed (l/min) 7.62 l/min   MAP (cm H2O) 15   I:E Ratio Actual 1:2.0   Safety & Alarms   Circuit Temperature 98.2 °F (36.8 °C)   Backup Mode Checked/Apnea Yes   Pressure Max 40 cm H2O   Ve Min 2   Ve Max 20   Vt Min 200 ml   Vt Max 1000 ml   RR Max 40   Ambu Bag Yes   Ambu Mask Yes   Age Specific Ventilator Associated Pneumonia Bundle   Patient Age Group Adult   Adult Ventilator Associated Pneumonia Bundle   Elevation of Head to 30-45 Degrees (Unless Contraindicated) Yes   Oxygen Therapy   Skin Assessment Clean, dry, & intact   Vent Method/Mode   Ventilation Method Conventional   Ventilator Mode Pressure control Pulmonary Toilet   Pulmonary Toilet Suction;H. O.B elevated

## 2022-10-25 NOTE — PROGRESS NOTES
[facility] - Progress Note  Admit Date: 10/19/2022    Assessment:      -Acute hypoxic respiratory failure, requiring intubation and mechanical ventilation.    -Cardiogenic shock, on pressor support. -A/c HFrEF/ICMY. CXR with pulmonary edema. LVEF 20-30%  -NSTEMI, troponin peaked at 8773.   -3V CAD. S/p LHC in setting of late presentation MI s/p IABP. Not a candidate for CABG/PCI at [facility] cardiology. No viability by cardiac MRI at [facility] along anterior wall. Cath findings as noted below:   Left main coronary artery: Short, patent   Left anterior descending coronary artery: 50% ostial; 99% mid   BRAULIO 1 flow to mid/distal LAD with right to left collaterals 70% diagonal 1   Left circumflex coronary artery: Non-dominant, 100% proximal   Distal fed by right to left collaterals   Right coronary artery:   Dominant, diffuse 80% proximal to mid 70% at crux into posterior descending artery   Short posterolateral branch   Collaterals to left circumflex and left anterior descending    Cardiac MRI  Enlarged LV with severely decreased function. LVEF of 23.9%. No viability in the proximal mid to distal LAD and and basal to apical circumflex territory. 4. Approximately 80% viability in the RCA territory. -PAF, on amiodarone and Eliquis as outpatient   -HTN, on Entresto, aldactone, lasix and lopressor as outpatient. -HLD  -DM  -H/o LLE DVT. -Tobacco abuse   -Partial code. Primary cardiologist is at [facility]. Plan:     Patient has extensive coronary artery disease with no viable options. Continued on IV Amiodarone. CT abdomen pending to r/o ileus/SBO, if no abdominal process noted on CT, would transition to po Amiodarone   Continue ASA, statin. Wean pressors as able. Diuretics held for worsening hypotension. Recommend to continue IV diuretics.    Continue supportive care.     ---------------------------------------------------  Patient has extensive cord artery disease as previously reviewed notes.  Rhythm more stable on IV amiodarone. We will plan to switch to oral amiodarone tomorrow if remains stable. Continue to wean pressors and supportive care. I saw, examined, and evaluated this patient and performed the substantive portion of the encounter for > 50% of the time including extensive history, physical exam, and medical decision making as discussed with patient and next-of-kin as needed. I personally reviewed the patient's labs, tests, vitals, orders, medications, updated history, and other providers assessments as well. I personally agree with the findings as stated and the plan as documented. Satinder Cadet MD      Subjective: Intubated. Alert.     Objective:      Patient Vitals for the past 8 hrs:   Temp Pulse Resp BP SpO2   10/25/22 0940 -- 96 24 -- 100 %   10/25/22 0800 97.5 °F (36.4 °C) 93 16 112/74 100 %   10/25/22 0700 -- 95 22 -- 100 %   10/25/22 0655 -- 83 14 -- 100 %   10/25/22 0625 -- 84 15 -- 100 %   10/25/22 0555 -- 85 28 -- 100 %   10/25/22 0525 -- 78 17 105/76 100 %   10/25/22 0455 -- 97 12 104/72 100 %           Patient Vitals for the past 96 hrs:   Weight   10/24/22 1247 81.6 kg (179 lb 14.3 oz)   10/22/22 0500 81.6 kg (179 lb 14.3 oz)                      Intake/Output Summary (Last 24 hours) at 10/25/2022 1231  Last data filed at 10/25/2022 0800  Gross per 24 hour   Intake 1790.14 ml   Output 510 ml   Net 1280.14 ml         Physical Exam:  General:  intubated on ventilator   Neck:  JVD   Lungs:  decreased B/L   Heart: RRR S1, S2 normal, no murmur, click, rub or gallop  Abdomen:  abdomen is soft   Extremities:  extremities normal, atraumatic, no cyanosis or edema    Data Review:     Labs: Results:       Chemistry Recent Labs     10/25/22  0310 10/24/22  2155 10/24/22  0531 10/23/22  2333 10/23/22  1100 10/23/22  0334   * 121* 143* 140*   < > 150*    143 141 141   < > 140   K 3.6 3.7 3.6 3.9   < > 3.8    107 105 106   < > 104   CO2 29 30 29 29   < > 26   BUN 21* 22* 22* 21*   < > 20*   CREA 0.85 0.72 0.71 0.84   < > 0.77   CA 9.3 8.7 9.2 8.7   < > 9.1   MG 2.3  --  2.3  --   --  2.3   PHOS  --  2.6 2.2* 2.5   < > 1.9*   AGAP 6 6 7 6   < > 10   BUCR 25* 31* 31* 25*   < > 26*   ALB  --  2.9* 3.0* 3.0*   < > 3.2*    < > = values in this interval not displayed.         CBC w/Diff Recent Labs     10/25/22  0310 10/24/22  0531 10/23/22  0334   WBC 5.9 6.3 9.2   RBC 3.00* 3.11* 3.19*   HGB 8.3* 8.8* 8.7*   HCT 27.8* 28.7* 28.8*    184 214   GRANS 77* 72 83*   LYMPH 13* 17* 10*   EOS 2 3 0        Cardiac Enzymes No results found for: CPK, CK, CKMMB, CKMB, RCK3, CKMBT, CKNDX, CKND1, LAURA, TROPT, TROIQ, LISA, TROPT, TNIPOC, BNP, BNPP   Coagulation Recent Labs     10/25/22  0310 10/24/22  0531   APTT 60.6* 74.3*         Lipid Panel No results found for: CHOL, CHOLPOCT, CHOLX, CHLST, CHOLV, 613619, HDL, HDLP, LDL, LDLC, DLDLP, 893675, VLDLC, VLDL, TGLX, TRIGL, TRIGP, TGLPOCT, CHHD, CHHDX   BNP No results found for: BNP, BNPP, XBNPT   Liver Enzymes Recent Labs     10/24/22  2155   ALB 2.9*        Digoxin    Thyroid Studies Lab Results   Component Value Date/Time    TSH 1.06 10/20/2022 05:15 AM          Signed By: Zoe Cabrales PA-C     October 25, 2022

## 2022-10-25 NOTE — PROGRESS NOTES
Nutrition Note    Discussed care during interdisciplinary rounds. Per RN, OGT currently to suction. plan for CT abd scan today. Scan 10/24 noted findings may reflect ileus or partial bowel obstruction. Pt down to two pressors, levo @ 4 mcg/min, vaso @ 0.03 units/min. Okay to initiate trickle feeds per MD if scan is okay. Nutrition Recommendations/Plan:   If to initiate tube feeds: rec Vital HP @ 10 ml/hr + FWF 30 ml q 4 hours (provides 240 kcal, 21g protein, 382 ml free water, 16% RDIs)  Plan to add thiamine supplementation 200 mg x 7 days d/t risk for refeeding.          Electronically signed by Luis Monterroso RD on 10/25/2022 at 9:51 AM    Contact: [phone]

## 2022-10-25 NOTE — PROGRESS NOTES
[address] [place] [address] [facility]. Pulmonary, Critical Care, and Sleep Medicine    Name: Horacio Garcia MRN: [id number]   : 1955 Hospital: [address] [place] [address] Dr   Date: 10/25/2022  Admission Date: 10/19/2022     Chart and notes reviewed. Data reviewed. I have evaluated all findings. [x]I have reviewed the flowsheet and previous days notes. [x]The patient is unable to give any meaningful history or review of systems because the patient is:  [x]Intubated [x]Sedated   []Unresponsive      [x]The patient is critically ill on      [x]Mechanical ventilation [x]Pressors   []BiPAP [x]         Interval HPI: 79year old with a past medical history of severe CHF (EF 20%), 3 vessel CAD, DVT, A fib, admitted to the unit with acute hypoxic respiratory failure and cardiogenic shock likely 2/2 acute on chronic CHF exacerbation and NSTEMI (on heparin gtt). Pt is s/p lasix gtt. Milrinone was stopped 2/2 worsening hypotension. Transfer to [facility] for initiation of ECMO, IABP, etc was declined. Course complicated by Afib RVR in which Amiodarone gtt started. Subjective 10/25/22  Hospital Day: 5    Vent Day: Intubated 10/20     - Patient remains on low-dose Levophed and Vasopressin gtt   - Remains on Amiodarone gtt   - Patient awake, but not tracking. Following simple commands, very weak. Remains on fentanyl gtt. Versed gtt weaned off   -  cc overnight  -Abd remains distended with bile colored output, KUB from 10/24 suggests possibility of Ileus vs. SBO, will obtain CT A/P to r/o above   -FIO2 improved on AM gas, FIO2 titrated down to 40%, PEEP 10; can likely continue to titrate to O2 sat during the day                   ROS:Review of systems not obtained due to patient factors. Events and notes from last 24 hours reviewed.      Patient Active Problem List   Diagnosis Code    Elevated prostate specific antigen (PSA) R97.20    Spermatocele of epididymis N43.40    BPH with obstruction/lower urinary tract symptoms N40.1, N13.8    Respiratory failure with hypoxia (HCC) J96.91    Goals of care, counseling/discussion Z71.89    Congestive heart failure ([facility] [address] I50.9    Encounter for palliative care Z51.5       Vital Signs:  Visit Vitals  /74   Pulse 93   Temp 97.5 °F (36.4 °C)   Resp 16   Ht 5' 7\" (1.702 m)   Wt 81.6 kg (179 lb 14.3 oz)   SpO2 100%   BMI 28.18 kg/m²       O2 Device: Endotracheal tube, Ventilator   O2 Flow Rate (L/min): 40 l/min   Temp (24hrs), Av.6 °F (36.4 °C), Min:97.3 °F (36.3 °C), Max:98.1 °F (36.7 °C)       Intake/Output:   Last shift:      10/25 0701 - 10/25 1900  In: 968.6 [I.V.:968.6]  Out: 25 [Urine:25]  Last 3 shifts: 10/23 190 - 10/25 0700  In: 1703.8 [I.V.:1653.8]  Out: 845 [Urine:645]    Intake/Output Summary (Last 24 hours) at 10/25/2022 0859  Last data filed at 10/25/2022 0800  Gross per 24 hour   Intake 1790.14 ml   Output 510 ml   Net 1280.14 ml          Current Facility-Administered Medications   Medication Dose Route Frequency    potassium chloride 20 mEq in 100 ml IVPB  20 mEq IntraVENous ONCE    NOREPINephrine (LEVOPHED) 8 mg in 5% dextrose 250mL (32 mcg/mL) infusion  0.5-50 mcg/min IntraVENous TITRATE    piperacillin-tazobactam (ZOSYN) 3.375 g in 0.9% sodium chloride (MBP/ADV) 100 mL MBP  3.375 g IntraVENous Q8H    amiodarone (NEXTERONE) 360 mg in dextrose 200 mL (1.8 mg/mL) infusion  0.5 mg/min IntraVENous TITRATE    [Held by provider] furosemide (LASIX) injection 40 mg  40 mg IntraVENous BID    insulin lispro (HUMALOG) injection   SubCUTAneous Q6H    sodium chloride (NS) flush 5-40 mL  5-40 mL IntraVENous Q8H    famotidine (PF) (PEPCID) 20 mg in 0.9% sodium chloride 10 mL injection  20 mg IntraVENous Q12H    chlorhexidine (PERIDEX) 0.12 % mouthwash 10 mL  10 mL Oral Q12H    fentaNYL (PF) 1,500 mcg/30 mL (50 mcg/mL) infusion  0-200 mcg/hr IntraVENous TITRATE    heparin (porcine) 25,000 units in 0.45% saline 250 ml infusion  11.79-27 Units/kg/hr IntraVENous TITRATE aspirin chewable tablet 81 mg  81 mg Per NG tube DAILY    atorvastatin (LIPITOR) tablet 40 mg  40 mg Per NG tube QHS    vasopressin (VASOSTRICT) 20 Units in 0.9% sodium chloride 100 mL infusion  0.03 Units/min IntraVENous CONTINUOUS         Telemetry: []Sinus []A-flutter []Paced    [x]A-fib []Multiple PVCs                  Physical Exam:      General:  Intubated/sedated, but will open eyes and follows simple commands. NAD   Head:  Normocephalic, without obvious abnormality, atraumatic. Eyes:  Conjunctivae/corneas clear. PERRL,   Nose: Nares normal. Septum midline. Mucosa normal. No drainage or sinus tenderness. Throat: Lips, mucosa, and tongue normal. Teeth and gums normal.   Neck: Supple, symmetrical, trachea midline, no adenopathy, thyroid: no enlargment/tenderness/nodules, no carotid bruit and no JVD. Back:   Symmetric, no curvature. Lungs:   Symmetrical chest rise, good AE bilat. Course. Coarse rhonchi. No rales or wheezes noted. Chest wall:  No tenderness or deformity. Heart:  irregular rate and rhythm, S1, S2 normal, no murmur, click, rub or gallop. Abdomen:   Abd distention with OGT to LIWS, Bowel sounds hypoactive. No masses,  No organomegaly. Extremities: Extremities normal, atraumatic, no cyanosis. BUE edema +1-2, non-pitting . + restraints    Pulses: 2+ and symmetric all extremities.    Skin: Skin color, texture, turgor normal. No rashes or lesions   Lymph nodes:  Cervical, supraclavicular, and axillary nodes normal.   Neurologic: Grossly nonfocal; following simple commands, generalized weakness          DATA:  MAR reviewed and pertinent medications noted or modified as needed    Labs:  Recent Labs     10/25/22  0310 10/24/22  0531 10/23/22  0334   WBC 5.9 6.3 9.2   HGB 8.3* 8.8* 8.7*   HCT 27.8* 28.7* 28.8*    184 214     Recent Labs     10/25/22  0310 10/24/22  2155 10/24/22  0531 10/23/22  2333 10/23/22  1100 10/23/22  0334    143 141 141   < > 140   K 3.6 3.7 3.6 3.9 < > 3.8    107 105 106   < > 104   CO2 29 30 29 29   < > 26   * 121* 143* 140*   < > 150*   BUN 21* 22* 22* 21*   < > 20*   CREA 0.85 0.72 0.71 0.84   < > 0.77   CA 9.3 8.7 9.2 8.7   < > 9.1   MG 2.3  --  2.3  --   --  2.3   PHOS  --  2.6 2.2* 2.5   < > 1.9*   ALB  --  2.9* 3.0* 3.0*   < > 3.2*    < > = values in this interval not displayed. No results for input(s): PH, PCO2, PO2, HCO3, FIO2 in the last 72 hours. Recent Labs     10/25/22  0345 10/24/22  0320 10/23/22  0313   FIO2I 55 55 35   HCO3I 28.7* 28.7* 26.6*   PCO2I 46.8* 49.4* 41.8   PHI 7.40 7.37 7.41   PO2I 142* 91 60*       Imaging:  [x]   I have personally reviewed the patients radiographs and reports  XR Results (most recent):  XR Results (most recent):  Results from Hospital Encounter encounter on 10/19/22    XR CHEST PORT    Narrative  HISTORY: Respiratory distress. Follow-up imaging. Trula Blew EXAM: Chest.    TECHNIQUE: Single view AP portable semiupright chest.    COMPARISON: 10/23/2022    FINDINGS: Minimally worsened aeration of bilateral hemithoraces is demonstrated  with persistent moderate bilateral diffuse interstitial prominence and  associated perihilar and bibasilar opacities. No pneumothorax or pleural  effusions. Endotracheal tube tip is projecting approximately 3.6 cm above the onofre. Unchanged position of nasogastric tube. Heart and mediastinal structures are unchanged. Heart is enlarged. . Visualized  bony thorax and soft tissues are within normal limits. Impression  IMPRESSION:    1. Minimally worsened sequela of moderate congestion. Follow-up with plain  imaging of the chest.  2. Tubes and catheters as above. CT Results (most recent):  Results from Hospital Encounter encounter on 10/19/22    CTA CHEST W OR W WO CONT    Narrative  EXAM: CT Angiogram of the Chest    CLINICAL INDICATION:  hypoxic    TECHNIQUE: CT angiogram of the chest performed following intravenous contrast  administration.  MIP reconstructions were performed. All CT scans at this facility are performed using dose optimization technique as  appropriate to a performed exam, to include automated exposure control,  adjustment of the mA and/or kV according to patient size (including appropriate  matching for site specific examination) or use of iterative reconstruction  technique. COMPARISON: Radiograph 2 hours prior    FINDINGS:  Limitations: Technically adequate for evaluation of proximal vessels. Pulmonary Arteries: No evidence of central, lobar or proximal segmental  pulmonary embolus. More distal branches are somewhat evaluated due to  respiratory motion and pulmonary consolidation. Aorta: No evidence of aortic aneurysm. Heart: Mild enlargement of the left ventricle with possible apical thinning. Pericardium: No pericardial effusion. Lungs: Deep tendon consolidation in bilateral lower lungs, with bilateral upper  lung groundglass opacities with relative subpleural sparing. Pleura: Moderate right, small left pleural effusion. Trachea and Bronchi: Endotracheal tube 3.4 cm above the onofre. Lower neck: Dense contrast pooling within the left subclavian vein. Left level 6  lymph node measuring up to 1.6 x 1.1 cm. Axilla/Chest wall: Small bilateral axillary lymph nodes, without pathologic  features. Cloteal Bhat Upper Abdomen: Large cyst right kidney measuring 5.2 cm, 2.5 cm in the left  kidney. Cholecystectomy. Gastric tube terminating within the gastric body. Mild  reflux of contrast into the IVC. Musculoskeletal: No acute osseous findings. Impression  1. No CT evidence of pulmonary embolus. 2.  Diffuse bilateral pulmonary alveolar opacities with dependent consolidation. Overall appearance is suggestive of diffuse alveolar dysfunction, including  hydrostatic pulmonary edema, alveolar hemorrhage, RDS. Bilateral pleural  effusions make pulmonary edema most likely.             IMPRESSION:   Acute Hypoxic Respiratory Failure - Requiring mechanical ventilation likely 2/2 CHF exacerbation with Cardiogenic shock on multiple vasopressor. Cardiogenic Shock - In the setting of acute HF and NSTEMI - on levo, vaso, epi with stable hemodynamics. Tachyarrhythmia-unable to determine underlying rhythm. Sinus tach with PVCs vs Afib RVR vs NSVT. On Amiodarone, which resolved arrhythmia, therefore suspect underlying rhythm is A. fib RVR as patient does have history of. Acute on chronic decompensated systolic heart failure - Echo 7/22 EF 20%  Acute Encephalopathy- likely toxic/metabolic in nature   NSTEMI - initial troponin 5K, on heparin gtt  E. 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  Hx of CAD  s/p cardiac cath 7/22 with LAD, circumflex and R coronary artery occlusion, s/p intra-aortic balloon pump assist 8/22 at [facility]  Hx of CHF - EF 20% 8/22  Hx of BPH  Unspecified psych disorder - previously on Risperidone, Paxil, Geodon     Patient Active Problem List   Diagnosis Code    Elevated prostate specific antigen (PSA) R97.20    Spermatocele of epididymis N43.40    BPH with obstruction/lower urinary tract symptoms N40.1, N13.8    Respiratory failure with hypoxia (HCC) J96.91    Goals of care, counseling/discussion Z71.89    Congestive heart failure ([facility] [address] I50.9    Encounter for palliative care Z51.5        RECOMMENDATIONS:   Neuro: Wean off Fentanyl gtt and start on Precedex gtt. RASS 0 to -1. PRN Fentanyl / Versed for breakthrough sedation. Pulm: Titrate FiO2 for SpO2 >90%. Optimize bronchial hygiene. Daily CXR and ABG while intubated. CVS: Con't vasopressors -- Levophed gtt and shock dose Vaso. Con't Amio gtt, can transition to PO when able per Cardiology recs,  Goal MAP >65mmHg. S/p Milrinone gtt and Lasix gtt (worsening hypotension). Hold off on diureses for now. Con't daily ASA and statin. Echo (10/20) EF 25-30%. Cardiology following -- recommends Comfort care.  No further interventions recommended as pt has no viability along anterior wall by MRI at [facility]. Pt therefore also not a candidate for aortic balloon pump or Impella device given that he is not a transplant candidate. Con't Heparin gtt. Monitor  for hemodynamic trend. GI: NPO. Keep OGT to LIWS. KUb with possible finding of ileus vs. SBO, will need CT A/P to r and obtain KUB. Abd remains distended. Can start TF if CT A/P with negative findings,  SUP(pepcid)  Renal: Trend Cr, UOP. Real. Strict I/Os  Hem/Onc: Trend H/H, monitor for s/o active bleeding. Daily CBC. I/D:Trend WBCs and temperature curve. Sputum Cx (10/20) now growing e. Coli. Repeat BxCx NGTD, Con't Zosyn for E. Coli PNA  Metabolic: Daily BMP, mag, phos. Trend lytes and replace per protocol. Replace potassium and phos   Endocrine:Q6 glucoses. SSI. Avoid hypoglycemia. Musc/Skin: wound care prn  DNR  Discussed in interdisciplinary rounds     Best Practices/Safety Bundles:  Sepsis Bundle per Hospital Protocol  Glycemic control; avoid Hypoglycemia  IHI ICU Bundles:   Central Line Bundle Followed , Real Bundle Followed, and Vent Bundle Followed, Vent Day 10/20  [facility] Vent patients/ Pulmonary pts:   VAP bundle, Aim to keep peak plateau pressure [id number] H2O  Aspiration Precautions - HOB >30'  Daily sedation holiday as indicated  SBT as tolerated/appropriate  Stress Ulcer prophylaxis: Pepcid   DVT prophylaxis: Heparin gtt  Need for Lines, real assessed.     Attending Non-violent Restraint Reevaluation     Patient does not require restraints at this time           DAVID time 15 mins  CHRISTIANO Gregory-BC  10/25/22  Pulmonary, Critical Care Medicine  [address] [place] [address] [facility]ists

## 2022-10-25 NOTE — DISCHARGE SUMMARY
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        LOS:  LOS: 5 days                Admit Date: 10/19/2022    Discharge Date: 10/25/2022    Admission Diagnoses: Respiratory failure with hypoxia ([place] [address] [J96.91]    Final Discharge Diagnoses:    Problem List as of 10/25/2022 Never Reviewed            Codes Class Noted - Resolved    Cardiogenic shock ([place] [address] ICD-10-CM: R57.0  ICD-9-CM: 785.51  10/25/2022 - Present        Ventricular tachyarrhythmia ICD-10-CM: I47.20  ICD-9-CM: 427.1  10/25/2022 - Present        Encephalopathy ICD-10-CM: G93.40  ICD-9-CM: 348.30  10/25/2022 - Present        NSTEMI (non-ST elevated myocardial infarction) ([place] [address] ICD-10-CM: I21.4  ICD-9-CM: 410.70  10/25/2022 - Present        E. coli pneumonia ([place] [address] ICD-10-CM: J15.5  ICD-9-CM: 482.82  10/25/2022 - Present        DVT (deep venous thrombosis) ([place] [address] ICD-10-CM: I82.409  ICD-9-CM: 453.40  10/25/2022 - Present        Goals of care, counseling/discussion ICD-10-CM: Z71.89  ICD-9-CM: V65.49  10/24/2022 - Present        Congestive heart failure ([place] [address] ICD-10-CM: I50.9  ICD-9-CM: 428.0  10/24/2022 - Present        Encounter for palliative care ICD-10-CM: Z51.5  ICD-9-CM: V66.7  10/24/2022 - Present        Respiratory failure with hypoxia ([place] [address] ICD-10-CM: J96.91  ICD-9-CM: 518.81  10/20/2022 - Present        Permanent atrial fibrillation ([place] [address] ICD-10-CM: J98.08  ICD-9-CM: 427.31  8/23/2022 - Present        Coronary artery disease involving native coronary artery of native heart with angina pectoris ([facility] [address] ICD-10-CM: I25.119  ICD-9-CM: 414.01, 413.9  7/30/2022 - Present    Overview Signed 10/25/2022  7:53 PM by Bertin Nettles PA-C     Formatting of this note is different from the original.  CORONARY ANGIOGRAPHY: From cardiac catheterization 7/30/2022       1) Left main coronary artery:     Short, patent       2) Left anterior descending coronary artery:   50% ostial   99% mid    BRAULIO 1 flow to mid/distal left anterior descending with right to    left collaterals   70% diagonal 1       3) Left circumflex coronary artery:   Non-dominant, 100% proximal   Distal fed by right to left collaterals       4) Right coronary artery:   Dominant, very tortuous proximally, diffuse 80% proximal to mid   70% at crux into posterior descending artery   Short posterolateral branch   Collaterals to left circumflex and left anterior descending         5) Ramus intermediate:    NA             Acute cardiogenic pulmonary edema (HCC) ICD-10-CM: I50.1  ICD-9-CM: 428.1  7/29/2022 - Present        On mechanically assisted ventilation (HCC) ICD-10-CM: Z99.11  ICD-9-CM: V46.11  7/29/2022 - Present        Elevated prostate specific antigen (PSA) ICD-10-CM: R97.20  ICD-9-CM: 790.93  4/20/2015 - Present        Spermatocele of epididymis ICD-10-CM: N43.40  ICD-9-CM: 608.1  4/20/2015 - Present        BPH with obstruction/lower urinary tract symptoms ICD-10-CM: N40.1, N13.8  ICD-9-CM: 600.01, 599.69  4/20/2015 - Present           Hospital Course:Mr. Jeremias Arnold was a 79year old with a past medical history of severe CHF (EF 20%), 3 vessel CAD, DVT, A fib, admitted to the unit with acute hypoxic respiratory failure and cardiogenic shock likely 2/2 acute on chronic CHF exacerbation and NSTEMI (on heparin gtt) on 10/20. Patient required intubation for resp failure. He was initiated on lasix gtt for fluid overload. Milrinone was attempted and stopped 2/2 worsening hypotension. Transfer to [facility] for initiation of ECMO, IABP, was declined. Per shock alert on 10/20: \"Dr. Amanda Casanova (cardiologist) and Dr. Melissa Fox (CT surgery) who reviewed the patient's chart thoroughly and deemed the patient not a candidate for ECMO, IABP, and heart transplant. Not a candidate for any intervention at this time. \" He requited multiple vasopressors for cardiogenic shock which improved slowly by 10/24.  His course was complicated by Afib RVR in which Amiodarone gtt started. He also developed an ileus vs SBO. He was treated for E. Coli pneumonia with antibiotics. Patient's GOC were continually addressed as patient had severe heart disease with ICMO. Patient's family made patient a DNR. On 10/25, patient had a ventricular fibrillation arrest. DNR was honored. Patient pronounced at [address], 10/25/2022. Significant Diagnostic Studies:     CT Results  (Last 48 hours)                 10/25/22 1343  CT ABD PELV WO CONT Final result    Impression:      1. Moderate colonic gas burden. No obstruction. 2.  Volume overload with generalized edema, presacral stranding, increased   moderate right and small left pleural effusions and mild to moderate anasarca. 3.  New aneurysmal dilation right common iliac up to 3 cm, left common iliac up   to 2.4 cm and left internal iliac up to 2.9 cm.   4.  Possible cystitis versus bladder outlet obstruction versus under distention. Significant prostatomegaly. 5.  The bilateral dependent lung consolidations appear more likely to represent   atelectasis than on prior exam.           Narrative:  CT ABDOMEN AND PELVIS WITHOUT ENHANCEMENT       INDICATION: Respiratory failure, cardiogenic shock, heart failure with gaseous   distention. TECHNIQUE: Axial images obtained of the abdomen and pelvis without intravenous   contrast. Coronal and sagittal reformatted images of the abdomen and pelvis were   obtained. All CT scans at this facility are performed using dose optimization technique as   appropriate to a performed exam, to include automated exposure control,   adjustment of the mA and/or kV according to patient size (including appropriate   matching first site-specific examinations), or use of iterative reconstruction   technique. COMPARISON: 10/20/2022; 3/16/2011. Lack of intravenous contrast renders this study suboptimal for evaluating the   solid abdominal organs, vasculature and bowel. Evaluation also limited by   patient positioning with arms at side and resultant beam hardening artifact. ABDOMEN FINDINGS:        Liver: Unremarkable. Spleen: Unremarkable. Pancreas: Unremarkable. Biliary: Cholecystectomy. Bowel: Tip and side-port of NG tube are within the gastric body. Moderate   colonic gas burden. Small bowel is normal in caliber. Colon is normal in   caliber. No bowel wall thickening or pneumatosis. Peritoneum/ Retroperitoneum: Generalized edema. Presacral stranding. No free   air. Lymph Nodes: Unremarkable. Adrenal Glands: Bilateral thickening. Kidneys: 6.4 cm right interpolar and 3 cm left upper to interpolar cyst. No   hydronephrosis. Vessels: Abdominal aorta is tortuous. Increased dilation right common iliac with   bifocal dilation measuring 3 and 2.3 cm, previously 1.7 cm. New  dilation left   common iliac measuring 2.4. Dilation proximal left internal iliac up to 2.9 cm,   the latter was previously 1.2 cm. Left femoral vein catheter with tip in the   external iliac vein. PELVIS FINDINGS:        Bladder/ Pelvic Organs: Petty in place. Bladder wall may be thickened, limited   assessment given decompression. Prostate is 6.7 x 5.8 x 6.8 cm, enlarged. Lung Base: Increased moderate right and small left pleural effusions. The   consolidations at the bilateral lower lobes appear more likely to represent   atelectasis than on prior exam.       Bones/Soft tissues: Mild to moderate anasarca. Osteopenia. Lumbar facet   hypertrophy and arthropathy. SI joint degenerative changes with anterior   spurring bilaterally. Narrowing bilateral hip joint spaces. Degenerative disc   disease. CXR Results  (Last 48 hours)                 10/25/22 0255  XR CHEST PORT Final result    Impression:      ET tube in appropriate position. Persistent and slightly increased bilateral pulmonary opacities and layering   pleural effusions.        Narrative: EXAM:  XR CHEST PORT       INDICATION:   ET tube placement       COMPARISON: 10/24/2022. FINDINGS:   Endotracheal tube in appropriate position. Enteric tube extends below the   diaphragm beyond the field-of-view. Stable enlarged cardiac silhouette. Pulmonary vascular congestion and diffuse   perihilar and basilar interstitial and airspace opacities, slightly increased. Probably layering small pleural effusions. No pneumothorax. 10/24/22 1104  XR CHEST PORT Final result    Impression:   IMPRESSION:       1. Minimally worsened sequela of moderate congestion. Follow-up with plain   imaging of the chest.   2. Tubes and catheters as above. Narrative:  HISTORY: Respiratory distress. Follow-up imaging. Kareem Kamar EXAM: Chest.       TECHNIQUE: Single view AP portable semiupright chest.        COMPARISON: 10/23/2022       FINDINGS: Minimally worsened aeration of bilateral hemithoraces is demonstrated   with persistent moderate bilateral diffuse interstitial prominence and   associated perihilar and bibasilar opacities. No pneumothorax or pleural   effusions. Endotracheal tube tip is projecting approximately 3.6 cm above the onofre. Unchanged position of nasogastric tube. Heart and mediastinal structures are unchanged. Heart is enlarged. . Visualized   bony thorax and soft tissues are within normal limits. eCHO 10/20/22    Left Ventricle Severely reduced left ventricular systolic function with a visually estimated EF of 25 - 30%. Left ventricle is dilated. Mild septal thickening. Global hypokinesis present. Indeterminate diastolic function due to E-A fusion. Left Atrium Left atrial volume index is moderately increased (42-48 mL/m2). LA Vol Index A/L is 42 mL/m2. Interatrial Septum No interatrial shunt visualized with color Doppler. Right Ventricle Right ventricle is dilated. Reduced systolic function. TAPSE is abnormal. TAPSE is 1.3 cm.    Right Atrium Right atrium size is normal.   Aortic Valve Valve structure is normal. No regurgitation. No stenosis. Mitral Valve Valve structure is normal. Mild to moderate regurgitation with an eccentrically directed jet and may underestimate severity. No stenosis noted. Tricuspid Valve Valve structure is normal. Trace regurgitation. No stenosis noted. Pulmonic Valve Valve structure is normal. No regurgitation. No stenosis noted. Pulmonary Artery Pulmonary hypertension not present. Aorta Dilated aortic root. Ao Root diameter is 3.5 cm. IVC/Hepatic Veins Patient is ventilated, cannot use IVC diameter to estimate right atrial pressure. Pericardium The pericardium is normal. No pericardial effusion. Cause of death:   Immediate Cause: ischemic heart disease   Underlying Causes, contributors to death: resp failure  Other significant conditions contributing to death : e coli pneumonia, DVT, a fibb     Was the ME contacted?  No, does not meet criteria  Was the family notified: yes  Name of notified  Erika Morin [phone]     Brody Bleacher Sister [phone]     SUKH HENDRICKS [facility] Daughter   [phone]       Discharge >30 mins   [place], [place]  10/25/22  Pulmonary, [address] [place] [address] [facility]

## 2022-10-25 NOTE — PROGRESS NOTES
attended the interdisciplinary rounds for Kendal Villaseñor, who is a 79 y.o.,male. Patients Primary Language is: [place]. According to the patients EMR Mu-ism Affiliation is: No Druze. The reason the Patient came to the hospital is:   Patient Active Problem List    Diagnosis Date Noted    Goals of care, counseling/discussion 10/24/2022    Congestive heart failure ([facility] [address] 10/24/2022    Encounter for palliative care 10/24/2022    Respiratory failure with hypoxia ([facility] [address] 10/20/2022    Elevated prostate specific antigen (PSA) 04/20/2015    Spermatocele of epididymis 04/20/2015    BPH with obstruction/lower urinary tract symptoms 04/20/2015      Plan:  Chaplains will continue to follow and will provide pastoral care on an as needed/requested basis.  recommends bedside caregivers page  on duty if patient shows signs of acute spiritual or emotional distress.     [address] [facility]  Board Certified [address] [facility]   [phone]

## 2022-10-25 NOTE — PROGRESS NOTES
Death Pronouncement Note    Patient lying in bed, mouth closed, eyes closed. Pupils fixed and equal bilaterally. No response to verbal or painful stimuli. No heart or lung sounds auscultated. No carotid or peripheral pulses.     Death officially pronounced at 1848, 10/25/2022    Medical Examiner notified: No, does not meet criteria    Family Notified: Damaris Kaur, AGACNP-BC  10/25/22  Pulmonary, [address] [facility] [facility]

## 2022-10-26 ENCOUNTER — APPOINTMENT (OUTPATIENT)
Dept: GENERAL RADIOLOGY | Age: 67
DRG: 283 | End: 2022-10-26
Payer: COMMERCIAL

## 2022-10-26 LAB
BACTERIA SPEC CULT: NORMAL
SERVICE CMNT-IMP: NORMAL

## 2022-10-26 NOTE — ROUTINE PROCESS
Per Gladys Morgan, patient released for all donations.
Detail Level: Detailed
Size Of Lesion: 4.3
X Size Of Lesion In Cm (Optional): 1.5

## 2022-10-26 NOTE — PROGRESS NOTES
was called to the bedside at the time of death.  offered bereavement care and prayer.     [address]  Board Certified   Director, [address] [address]  Office: [phone]

## 2022-10-28 LAB
BACTERIA SPEC CULT: NORMAL
BACTERIA SPEC CULT: NORMAL
SERVICE CMNT-IMP: NORMAL
SERVICE CMNT-IMP: NORMAL